# Patient Record
Sex: FEMALE | Race: WHITE | NOT HISPANIC OR LATINO | Employment: FULL TIME | URBAN - NONMETROPOLITAN AREA
[De-identification: names, ages, dates, MRNs, and addresses within clinical notes are randomized per-mention and may not be internally consistent; named-entity substitution may affect disease eponyms.]

---

## 2016-12-13 LAB
CBC, PLATELET CT, AND DIFF: (no result)
EXTERNAL ABO GROUPING: (no result)
EXTERNAL AMPHETAMINE SCREEN URINE: NEGATIVE
EXTERNAL ANTIBODY SCREEN: NORMAL
EXTERNAL BARBITURATE SCREEN URINE: NEGATIVE
EXTERNAL BENZODIAZEPINE SCREEN URINE: NEGATIVE
EXTERNAL COCAINE SCREEN URINE: NEGATIVE
EXTERNAL GC/CHLAMYDIA: NEGATIVE
EXTERNAL HEPATITIS B SURFACE ANTIGEN: NEGATIVE
EXTERNAL HEPATITIS C AB: NEGATIVE
EXTERNAL METHADONE SCREEN URINE: NEGATIVE
EXTERNAL OPIATES SCREEN URINE: NEGATIVE
EXTERNAL PHENCYCLIDINE SCREEN URINE: NEGATIVE
EXTERNAL PROPOXYPHENE SCREEN URINE: NEGATIVE
EXTERNAL RH FACTOR: POSITIVE
EXTERNAL RUBELLA QUALITATIVE: (no result)
EXTERNAL SYPHILIS RPR SCREEN: (no result)
EXTERNAL THC SCREEN URINE: NEGATIVE
EXTERNAL URINE CULTURE: NO GROWTH
HIV1 AB SPEC QL IA.RAPID: NEGATIVE

## 2017-01-04 LAB — EXTERNAL THINPREP: NORMAL

## 2017-02-08 ENCOUNTER — LAB (OUTPATIENT)
Dept: LAB | Facility: HOSPITAL | Age: 27
End: 2017-02-08

## 2017-02-08 ENCOUNTER — INITIAL PRENATAL (OUTPATIENT)
Dept: OBSTETRICS AND GYNECOLOGY | Facility: CLINIC | Age: 27
End: 2017-02-08

## 2017-02-08 VITALS — WEIGHT: 157 LBS | DIASTOLIC BLOOD PRESSURE: 64 MMHG | SYSTOLIC BLOOD PRESSURE: 102 MMHG | BODY MASS INDEX: 26.13 KG/M2

## 2017-02-08 DIAGNOSIS — Z36.89 ENCOUNTER FOR FETAL ANATOMIC SURVEY: ICD-10-CM

## 2017-02-08 DIAGNOSIS — Z34.92 ENCOUNTER FOR PREGNANCY RELATED EXAMINATION IN SECOND TRIMESTER: ICD-10-CM

## 2017-02-08 DIAGNOSIS — Z34.92 ENCOUNTER FOR PREGNANCY RELATED EXAMINATION IN SECOND TRIMESTER: Primary | ICD-10-CM

## 2017-02-08 DIAGNOSIS — Z3A.16 16 WEEKS GESTATION OF PREGNANCY: ICD-10-CM

## 2017-02-08 PROCEDURE — 82677 ASSAY OF ESTRIOL: CPT | Performed by: OBSTETRICS & GYNECOLOGY

## 2017-02-08 PROCEDURE — 82105 ALPHA-FETOPROTEIN SERUM: CPT | Performed by: OBSTETRICS & GYNECOLOGY

## 2017-02-08 PROCEDURE — 84702 CHORIONIC GONADOTROPIN TEST: CPT | Performed by: OBSTETRICS & GYNECOLOGY

## 2017-02-08 PROCEDURE — 36415 COLL VENOUS BLD VENIPUNCTURE: CPT

## 2017-02-08 PROCEDURE — 0501F PRENATAL FLOW SHEET: CPT | Performed by: OBSTETRICS & GYNECOLOGY

## 2017-02-08 PROCEDURE — 86336 INHIBIN A: CPT | Performed by: OBSTETRICS & GYNECOLOGY

## 2017-02-08 RX ORDER — PRENATAL VIT NO.126/IRON/FOLIC 28MG-0.8MG
TABLET ORAL DAILY
COMMUNITY
End: 2021-02-02

## 2017-02-08 NOTE — PROGRESS NOTES
Transfer of care.  Underwent C/S at 40 after NRFHT with induction.  Interested in . Discussed R/B/A

## 2017-02-10 LAB
2ND TRIMESTER 4 SCREEN SERPL-IMP: NORMAL
AFP ADJ MOM SERPL: 1.03
AFP INTERP SERPL-IMP: NORMAL
AFP SERPL-MCNC: 33.4 NG/ML
AGE AT DELIVERY: 28.5 YEARS
FET TS 18 RISK FROM MAT AGE: NORMAL
FET TS 21 RISK FROM MAT AGE: 820
GA METHOD: NORMAL
GA: 16.3 WEEKS
HCG ADJ MOM SERPL: 0.87
HCG SERPL-ACNC: NORMAL MIU/ML
IDDM PATIENT QL: NO
INHIBIN A ADJ MOM SERPL: 0.79
INHIBIN A SERPL-MCNC: 133.61 PG/ML
LABORATORY COMMENT REPORT: NORMAL
Lab: NORMAL
MULTIPLE PREGNANCY: NO
NEURAL TUBE DEFECT RISK FETUS: NORMAL %
RESULT: NORMAL
TS 18 RISK FETUS: NORMAL
TS 21 RISK FETUS: 5258
U ESTRIOL ADJ MOM SERPL: 0.79
U ESTRIOL SERPL-MCNC: 0.72 NG/ML

## 2017-03-01 RX ORDER — OSELTAMIVIR PHOSPHATE 75 MG/1
75 CAPSULE ORAL 2 TIMES DAILY
Qty: 14 CAPSULE | Refills: 0 | Status: SHIPPED | OUTPATIENT
Start: 2017-03-01 | End: 2017-03-08

## 2017-03-08 ENCOUNTER — ROUTINE PRENATAL (OUTPATIENT)
Dept: OBSTETRICS AND GYNECOLOGY | Facility: CLINIC | Age: 27
End: 2017-03-08

## 2017-03-08 VITALS — SYSTOLIC BLOOD PRESSURE: 106 MMHG | BODY MASS INDEX: 27.12 KG/M2 | DIASTOLIC BLOOD PRESSURE: 72 MMHG | WEIGHT: 163 LBS

## 2017-03-08 DIAGNOSIS — Z3A.20 20 WEEKS GESTATION OF PREGNANCY: ICD-10-CM

## 2017-03-08 DIAGNOSIS — Z34.92 ENCOUNTER FOR PREGNANCY RELATED EXAMINATION IN SECOND TRIMESTER: Primary | ICD-10-CM

## 2017-03-08 PROCEDURE — 0502F SUBSEQUENT PRENATAL CARE: CPT | Performed by: OBSTETRICS & GYNECOLOGY

## 2017-04-05 ENCOUNTER — ROUTINE PRENATAL (OUTPATIENT)
Dept: OBSTETRICS AND GYNECOLOGY | Facility: CLINIC | Age: 27
End: 2017-04-05

## 2017-04-05 VITALS — SYSTOLIC BLOOD PRESSURE: 108 MMHG | BODY MASS INDEX: 28.12 KG/M2 | WEIGHT: 169 LBS | DIASTOLIC BLOOD PRESSURE: 72 MMHG

## 2017-04-05 DIAGNOSIS — Z34.92 ENCOUNTER FOR PREGNANCY RELATED EXAMINATION IN SECOND TRIMESTER: Primary | ICD-10-CM

## 2017-04-05 DIAGNOSIS — Z3A.24 24 WEEKS GESTATION OF PREGNANCY: ICD-10-CM

## 2017-04-05 PROCEDURE — 0502F SUBSEQUENT PRENATAL CARE: CPT | Performed by: OBSTETRICS & GYNECOLOGY

## 2017-04-10 ENCOUNTER — RESULTS ENCOUNTER (OUTPATIENT)
Dept: OBSTETRICS AND GYNECOLOGY | Facility: CLINIC | Age: 27
End: 2017-04-10

## 2017-04-10 DIAGNOSIS — Z34.92 ENCOUNTER FOR PREGNANCY RELATED EXAMINATION IN SECOND TRIMESTER: ICD-10-CM

## 2017-04-27 DIAGNOSIS — Z36.9 ENCOUNTER FOR ANTENATAL SCREENING: Primary | ICD-10-CM

## 2017-05-02 ENCOUNTER — ROUTINE PRENATAL (OUTPATIENT)
Dept: OBSTETRICS AND GYNECOLOGY | Facility: CLINIC | Age: 27
End: 2017-05-02

## 2017-05-02 ENCOUNTER — APPOINTMENT (OUTPATIENT)
Dept: LAB | Facility: HOSPITAL | Age: 27
End: 2017-05-02

## 2017-05-02 VITALS — BODY MASS INDEX: 27.96 KG/M2 | WEIGHT: 168 LBS | SYSTOLIC BLOOD PRESSURE: 102 MMHG | DIASTOLIC BLOOD PRESSURE: 68 MMHG

## 2017-05-02 DIAGNOSIS — Z3A.28 28 WEEKS GESTATION OF PREGNANCY: ICD-10-CM

## 2017-05-02 DIAGNOSIS — O34.211 MATERNAL CARE DUE TO LOW TRANSVERSE UTERINE SCAR FROM PREVIOUS CESAREAN DELIVERY: Primary | ICD-10-CM

## 2017-05-02 PROBLEM — Z34.92 ENCOUNTER FOR PREGNANCY RELATED EXAMINATION IN SECOND TRIMESTER: Status: RESOLVED | Noted: 2017-02-08 | Resolved: 2017-05-02

## 2017-05-02 LAB
BASOPHILS # BLD AUTO: 0.01 10*3/MM3 (ref 0–0.2)
BASOPHILS NFR BLD AUTO: 0.1 % (ref 0–2)
DEPRECATED RDW RBC AUTO: 46 FL (ref 36.4–46.3)
EOSINOPHIL # BLD AUTO: 0.04 10*3/MM3 (ref 0–0.7)
EOSINOPHIL NFR BLD AUTO: 0.5 % (ref 0–7)
ERYTHROCYTE [DISTWIDTH] IN BLOOD BY AUTOMATED COUNT: 13.9 % (ref 11.5–14.5)
GLUCOSE 1H P 100 G GLC PO SERPL-MCNC: 129 MG/DL (ref 60–140)
HCT VFR BLD AUTO: 33.7 % (ref 35–45)
HGB BLD-MCNC: 11.8 G/DL (ref 12–15.5)
IMM GRANULOCYTES # BLD: 0.03 10*3/MM3 (ref 0–0.02)
IMM GRANULOCYTES NFR BLD: 0.3 % (ref 0–0.5)
LYMPHOCYTES # BLD AUTO: 0.91 10*3/MM3 (ref 0.6–4.2)
LYMPHOCYTES NFR BLD AUTO: 10.3 % (ref 10–50)
MCH RBC QN AUTO: 31.9 PG (ref 26.5–34)
MCHC RBC AUTO-ENTMCNC: 35 G/DL (ref 31.4–36)
MCV RBC AUTO: 91.1 FL (ref 80–98)
MONOCYTES # BLD AUTO: 0.46 10*3/MM3 (ref 0–0.9)
MONOCYTES NFR BLD AUTO: 5.2 % (ref 0–12)
NEUTROPHILS # BLD AUTO: 7.36 10*3/MM3 (ref 2–8.6)
NEUTROPHILS NFR BLD AUTO: 83.6 % (ref 37–80)
PLATELET # BLD AUTO: 162 10*3/MM3 (ref 150–450)
PMV BLD AUTO: 10.5 FL (ref 8–12)
RBC # BLD AUTO: 3.7 10*6/MM3 (ref 3.77–5.16)
WBC NRBC COR # BLD: 8.81 10*3/MM3 (ref 3.2–9.8)

## 2017-05-02 PROCEDURE — 82950 GLUCOSE TEST: CPT | Performed by: OBSTETRICS & GYNECOLOGY

## 2017-05-02 PROCEDURE — 0502F SUBSEQUENT PRENATAL CARE: CPT | Performed by: OBSTETRICS & GYNECOLOGY

## 2017-05-02 PROCEDURE — 85025 COMPLETE CBC W/AUTO DIFF WBC: CPT | Performed by: OBSTETRICS & GYNECOLOGY

## 2017-05-10 ENCOUNTER — TELEPHONE (OUTPATIENT)
Dept: OBSTETRICS AND GYNECOLOGY | Facility: CLINIC | Age: 27
End: 2017-05-10

## 2017-05-11 ENCOUNTER — PATIENT MESSAGE (OUTPATIENT)
Dept: OBSTETRICS AND GYNECOLOGY | Facility: CLINIC | Age: 27
End: 2017-05-11

## 2017-05-15 ENCOUNTER — ROUTINE PRENATAL (OUTPATIENT)
Dept: OBSTETRICS AND GYNECOLOGY | Facility: CLINIC | Age: 27
End: 2017-05-15

## 2017-05-15 VITALS — BODY MASS INDEX: 28.79 KG/M2 | DIASTOLIC BLOOD PRESSURE: 78 MMHG | SYSTOLIC BLOOD PRESSURE: 118 MMHG | WEIGHT: 173 LBS

## 2017-05-15 DIAGNOSIS — O34.211 MATERNAL CARE DUE TO LOW TRANSVERSE UTERINE SCAR FROM PREVIOUS CESAREAN DELIVERY: Primary | ICD-10-CM

## 2017-05-15 DIAGNOSIS — Z3A.30 30 WEEKS GESTATION OF PREGNANCY: ICD-10-CM

## 2017-05-15 PROCEDURE — 0502F SUBSEQUENT PRENATAL CARE: CPT | Performed by: OBSTETRICS & GYNECOLOGY

## 2017-05-31 ENCOUNTER — ROUTINE PRENATAL (OUTPATIENT)
Dept: OBSTETRICS AND GYNECOLOGY | Facility: CLINIC | Age: 27
End: 2017-05-31

## 2017-05-31 VITALS — BODY MASS INDEX: 28.46 KG/M2 | DIASTOLIC BLOOD PRESSURE: 69 MMHG | WEIGHT: 171 LBS | SYSTOLIC BLOOD PRESSURE: 109 MMHG

## 2017-05-31 DIAGNOSIS — Z3A.32 32 WEEKS GESTATION OF PREGNANCY: ICD-10-CM

## 2017-05-31 DIAGNOSIS — O34.211 MATERNAL CARE DUE TO LOW TRANSVERSE UTERINE SCAR FROM PREVIOUS CESAREAN DELIVERY: Primary | ICD-10-CM

## 2017-05-31 PROCEDURE — 0502F SUBSEQUENT PRENATAL CARE: CPT | Performed by: OBSTETRICS & GYNECOLOGY

## 2017-06-14 ENCOUNTER — ROUTINE PRENATAL (OUTPATIENT)
Dept: OBSTETRICS AND GYNECOLOGY | Facility: CLINIC | Age: 27
End: 2017-06-14

## 2017-06-14 VITALS — WEIGHT: 172 LBS | BODY MASS INDEX: 28.62 KG/M2 | DIASTOLIC BLOOD PRESSURE: 62 MMHG | SYSTOLIC BLOOD PRESSURE: 110 MMHG

## 2017-06-14 DIAGNOSIS — Z3A.34 34 WEEKS GESTATION OF PREGNANCY: ICD-10-CM

## 2017-06-14 DIAGNOSIS — O34.211 MATERNAL CARE DUE TO LOW TRANSVERSE UTERINE SCAR FROM PREVIOUS CESAREAN DELIVERY: Primary | ICD-10-CM

## 2017-06-14 PROCEDURE — 0502F SUBSEQUENT PRENATAL CARE: CPT | Performed by: OBSTETRICS & GYNECOLOGY

## 2017-06-28 ENCOUNTER — ROUTINE PRENATAL (OUTPATIENT)
Dept: OBSTETRICS AND GYNECOLOGY | Facility: CLINIC | Age: 27
End: 2017-06-28

## 2017-06-28 ENCOUNTER — APPOINTMENT (OUTPATIENT)
Dept: LAB | Facility: HOSPITAL | Age: 27
End: 2017-06-28

## 2017-06-28 VITALS — SYSTOLIC BLOOD PRESSURE: 118 MMHG | BODY MASS INDEX: 29.45 KG/M2 | WEIGHT: 177 LBS | DIASTOLIC BLOOD PRESSURE: 76 MMHG

## 2017-06-28 DIAGNOSIS — Z3A.36 36 WEEKS GESTATION OF PREGNANCY: ICD-10-CM

## 2017-06-28 DIAGNOSIS — O34.211 MATERNAL CARE DUE TO LOW TRANSVERSE UTERINE SCAR FROM PREVIOUS CESAREAN DELIVERY: ICD-10-CM

## 2017-06-28 DIAGNOSIS — Z36.9 ENCOUNTER FOR ANTENATAL SCREENING: Primary | ICD-10-CM

## 2017-06-28 PROCEDURE — 0502F SUBSEQUENT PRENATAL CARE: CPT | Performed by: OBSTETRICS & GYNECOLOGY

## 2017-06-28 PROCEDURE — 87653 STREP B DNA AMP PROBE: CPT | Performed by: OBSTETRICS & GYNECOLOGY

## 2017-06-29 LAB — GROUP B STREP, DNA: NEGATIVE

## 2017-07-06 ENCOUNTER — ROUTINE PRENATAL (OUTPATIENT)
Dept: OBSTETRICS AND GYNECOLOGY | Facility: CLINIC | Age: 27
End: 2017-07-06

## 2017-07-06 VITALS — DIASTOLIC BLOOD PRESSURE: 78 MMHG | WEIGHT: 178 LBS | BODY MASS INDEX: 29.62 KG/M2 | SYSTOLIC BLOOD PRESSURE: 122 MMHG

## 2017-07-06 DIAGNOSIS — O34.211 MATERNAL CARE DUE TO LOW TRANSVERSE UTERINE SCAR FROM PREVIOUS CESAREAN DELIVERY: Primary | ICD-10-CM

## 2017-07-06 DIAGNOSIS — Z3A.37 37 WEEKS GESTATION OF PREGNANCY: ICD-10-CM

## 2017-07-06 PROCEDURE — 0502F SUBSEQUENT PRENATAL CARE: CPT | Performed by: OBSTETRICS & GYNECOLOGY

## 2017-07-06 NOTE — PROGRESS NOTES
SOULEYMANE     Has had occasional ctx, nothing regular or painful.  Has had some mucous discharge, watery at times.  No vb.  +FM    SSE - negative pooling, perineum dry, nitrazine equiv, negative ferning +sperm present (patient does admit to sexual intercourse in past 24 hours)    A/P: SOULEYMANE @ 37w3d  - Term labor precautions   - No concern for ROM given negative pooling and ferning.  LOF precautions given  - Trying for   - GBS negative, RH positive  - RTC in 1 week with Dr. Vitale.     Jailyn Miranda

## 2017-07-12 ENCOUNTER — ROUTINE PRENATAL (OUTPATIENT)
Dept: OBSTETRICS AND GYNECOLOGY | Facility: CLINIC | Age: 27
End: 2017-07-12

## 2017-07-12 VITALS — WEIGHT: 175 LBS | DIASTOLIC BLOOD PRESSURE: 68 MMHG | SYSTOLIC BLOOD PRESSURE: 120 MMHG | BODY MASS INDEX: 29.12 KG/M2

## 2017-07-12 DIAGNOSIS — O34.211 MATERNAL CARE DUE TO LOW TRANSVERSE UTERINE SCAR FROM PREVIOUS CESAREAN DELIVERY: Primary | ICD-10-CM

## 2017-07-12 DIAGNOSIS — Z3A.38 38 WEEKS GESTATION OF PREGNANCY: ICD-10-CM

## 2017-07-12 PROCEDURE — 0502F SUBSEQUENT PRENATAL CARE: CPT | Performed by: OBSTETRICS & GYNECOLOGY

## 2017-07-18 ENCOUNTER — ROUTINE PRENATAL (OUTPATIENT)
Dept: OBSTETRICS AND GYNECOLOGY | Facility: CLINIC | Age: 27
End: 2017-07-18

## 2017-07-18 VITALS — BODY MASS INDEX: 29.29 KG/M2 | DIASTOLIC BLOOD PRESSURE: 68 MMHG | SYSTOLIC BLOOD PRESSURE: 118 MMHG | WEIGHT: 176 LBS

## 2017-07-18 DIAGNOSIS — Z3A.39 39 WEEKS GESTATION OF PREGNANCY: ICD-10-CM

## 2017-07-18 DIAGNOSIS — O34.211 MATERNAL CARE DUE TO LOW TRANSVERSE UTERINE SCAR FROM PREVIOUS CESAREAN DELIVERY: Primary | ICD-10-CM

## 2017-07-18 PROCEDURE — 0502F SUBSEQUENT PRENATAL CARE: CPT | Performed by: OBSTETRICS & GYNECOLOGY

## 2017-07-18 NOTE — PROGRESS NOTES
Notes pubic pain and pressure, heard a pop last wk and this was followed by a long walk and pubic area pain has been more severe and consistent since that time.

## 2017-07-19 ENCOUNTER — PREP FOR SURGERY (OUTPATIENT)
Dept: OTHER | Facility: HOSPITAL | Age: 27
End: 2017-07-19

## 2017-07-19 DIAGNOSIS — O34.211 MATERNAL CARE DUE TO LOW TRANSVERSE UTERINE SCAR FROM PREVIOUS CESAREAN DELIVERY: Primary | ICD-10-CM

## 2017-07-19 RX ORDER — LIDOCAINE HYDROCHLORIDE 10 MG/ML
5 INJECTION, SOLUTION INFILTRATION; PERINEURAL AS NEEDED
Status: CANCELLED | OUTPATIENT
Start: 2017-07-19

## 2017-07-19 RX ORDER — SODIUM CHLORIDE, SODIUM LACTATE, POTASSIUM CHLORIDE, CALCIUM CHLORIDE 600; 310; 30; 20 MG/100ML; MG/100ML; MG/100ML; MG/100ML
125 INJECTION, SOLUTION INTRAVENOUS CONTINUOUS
Status: CANCELLED | OUTPATIENT
Start: 2017-07-19

## 2017-07-19 RX ORDER — TRISODIUM CITRATE DIHYDRATE AND CITRIC ACID MONOHYDRATE 500; 334 MG/5ML; MG/5ML
30 SOLUTION ORAL ONCE
Status: CANCELLED | OUTPATIENT
Start: 2017-07-19 | End: 2017-07-19

## 2017-07-19 RX ORDER — METHYLERGONOVINE MALEATE 0.2 MG/ML
200 INJECTION INTRAVENOUS ONCE AS NEEDED
Status: CANCELLED | OUTPATIENT
Start: 2017-07-19

## 2017-07-19 RX ORDER — CARBOPROST TROMETHAMINE 250 UG/ML
250 INJECTION, SOLUTION INTRAMUSCULAR AS NEEDED
Status: CANCELLED | OUTPATIENT
Start: 2017-07-19

## 2017-07-19 RX ORDER — MISOPROSTOL 200 UG/1
800 TABLET ORAL AS NEEDED
Status: CANCELLED | OUTPATIENT
Start: 2017-07-19

## 2017-07-19 RX ORDER — SODIUM CHLORIDE 0.9 % (FLUSH) 0.9 %
1-10 SYRINGE (ML) INJECTION AS NEEDED
Status: CANCELLED | OUTPATIENT
Start: 2017-07-19

## 2017-07-21 ENCOUNTER — HOSPITAL ENCOUNTER (INPATIENT)
Facility: HOSPITAL | Age: 27
LOS: 2 days | Discharge: HOME | End: 2017-07-23
Attending: OBSTETRICS & GYNECOLOGY | Admitting: OBSTETRICS & GYNECOLOGY

## 2017-07-21 ENCOUNTER — ANESTHESIA EVENT (OUTPATIENT)
Dept: LABOR AND DELIVERY | Facility: HOSPITAL | Age: 27
End: 2017-07-21

## 2017-07-21 ENCOUNTER — ANESTHESIA (OUTPATIENT)
Dept: LABOR AND DELIVERY | Facility: HOSPITAL | Age: 27
End: 2017-07-21

## 2017-07-21 DIAGNOSIS — G89.18 POSTOPERATIVE PAIN: Primary | ICD-10-CM

## 2017-07-21 DIAGNOSIS — O34.211 MATERNAL CARE DUE TO LOW TRANSVERSE UTERINE SCAR FROM PREVIOUS CESAREAN DELIVERY: ICD-10-CM

## 2017-07-21 DIAGNOSIS — Z30.2 STERILIZATION: ICD-10-CM

## 2017-07-21 PROBLEM — O34.219 MATERNAL CARE FOR SCAR FROM PREVIOUS CESAREAN DELIVERY: Status: ACTIVE | Noted: 2017-07-21

## 2017-07-21 LAB
ABO GROUP BLD: NORMAL
AMPHET+METHAMPHET UR QL: NEGATIVE
BARBITURATES UR QL SCN: NEGATIVE
BENZODIAZ UR QL SCN: NEGATIVE
BLD GP AB SCN SERPL QL: NEGATIVE
CANNABINOIDS SERPL QL: NEGATIVE
COCAINE UR QL: NEGATIVE
DEPRECATED RDW RBC AUTO: 46 FL (ref 36.4–46.3)
ERYTHROCYTE [DISTWIDTH] IN BLOOD BY AUTOMATED COUNT: 13.9 % (ref 11.5–14.5)
HCT VFR BLD AUTO: 34.1 % (ref 35–45)
HGB BLD-MCNC: 11.8 G/DL (ref 12–15.5)
HOLD SPECIMEN: NORMAL
Lab: NORMAL
MCH RBC QN AUTO: 31.9 PG (ref 26.5–34)
MCHC RBC AUTO-ENTMCNC: 34.6 G/DL (ref 31.4–36)
MCV RBC AUTO: 92.2 FL (ref 80–98)
METHADONE UR QL SCN: NEGATIVE
OPIATES UR QL: NEGATIVE
OXYCODONE UR QL SCN: NEGATIVE
PLATELET # BLD AUTO: 155 10*3/MM3 (ref 150–450)
PMV BLD AUTO: 11.6 FL (ref 8–12)
RBC # BLD AUTO: 3.7 10*6/MM3 (ref 3.77–5.16)
RH BLD: POSITIVE
WBC NRBC COR # BLD: 8.63 10*3/MM3 (ref 3.2–9.8)

## 2017-07-21 PROCEDURE — 80307 DRUG TEST PRSMV CHEM ANLYZR: CPT | Performed by: OBSTETRICS & GYNECOLOGY

## 2017-07-21 PROCEDURE — 86850 RBC ANTIBODY SCREEN: CPT | Performed by: OBSTETRICS & GYNECOLOGY

## 2017-07-21 PROCEDURE — 59510 CESAREAN DELIVERY: CPT | Performed by: OBSTETRICS & GYNECOLOGY

## 2017-07-21 PROCEDURE — 86900 BLOOD TYPING SEROLOGIC ABO: CPT | Performed by: OBSTETRICS & GYNECOLOGY

## 2017-07-21 PROCEDURE — 25010000002 ONDANSETRON PER 1 MG: Performed by: NURSE ANESTHETIST, CERTIFIED REGISTERED

## 2017-07-21 PROCEDURE — 86901 BLOOD TYPING SEROLOGIC RH(D): CPT | Performed by: OBSTETRICS & GYNECOLOGY

## 2017-07-21 PROCEDURE — 25010000002 FENTANYL CITRATE (PF) 100 MCG/2ML SOLUTION: Performed by: NURSE ANESTHETIST, CERTIFIED REGISTERED

## 2017-07-21 PROCEDURE — 25010000002 MORPHINE PER 10 MG: Performed by: OBSTETRICS & GYNECOLOGY

## 2017-07-21 PROCEDURE — 0UB70ZZ EXCISION OF BILATERAL FALLOPIAN TUBES, OPEN APPROACH: ICD-10-PCS | Performed by: OBSTETRICS & GYNECOLOGY

## 2017-07-21 PROCEDURE — 88302 TISSUE EXAM BY PATHOLOGIST: CPT | Performed by: OBSTETRICS & GYNECOLOGY

## 2017-07-21 PROCEDURE — 88302 TISSUE EXAM BY PATHOLOGIST: CPT | Performed by: PATHOLOGY

## 2017-07-21 PROCEDURE — 85027 COMPLETE CBC AUTOMATED: CPT | Performed by: OBSTETRICS & GYNECOLOGY

## 2017-07-21 PROCEDURE — 25010000003 CEFAZOLIN PER 500 MG: Performed by: NURSE ANESTHETIST, CERTIFIED REGISTERED

## 2017-07-21 PROCEDURE — 25010000002 CEFAZOLIN PER 500 MG: Performed by: OBSTETRICS & GYNECOLOGY

## 2017-07-21 PROCEDURE — C1755 CATHETER, INTRASPINAL: HCPCS

## 2017-07-21 PROCEDURE — 58611 LIGATE OVIDUCT(S) ADD-ON: CPT | Performed by: OBSTETRICS & GYNECOLOGY

## 2017-07-21 PROCEDURE — 51703 INSERT BLADDER CATH COMPLEX: CPT

## 2017-07-21 RX ORDER — IBUPROFEN 800 MG/1
800 TABLET ORAL EVERY 8 HOURS SCHEDULED
Status: DISCONTINUED | OUTPATIENT
Start: 2017-07-21 | End: 2017-07-23 | Stop reason: HOSPADM

## 2017-07-21 RX ORDER — SODIUM CHLORIDE, SODIUM LACTATE, POTASSIUM CHLORIDE, CALCIUM CHLORIDE 600; 310; 30; 20 MG/100ML; MG/100ML; MG/100ML; MG/100ML
125 INJECTION, SOLUTION INTRAVENOUS CONTINUOUS
Status: DISCONTINUED | OUTPATIENT
Start: 2017-07-21 | End: 2017-07-23

## 2017-07-21 RX ORDER — DOCUSATE SODIUM 100 MG/1
100 CAPSULE, LIQUID FILLED ORAL 2 TIMES DAILY PRN
Status: DISCONTINUED | OUTPATIENT
Start: 2017-07-21 | End: 2017-07-23 | Stop reason: HOSPADM

## 2017-07-21 RX ORDER — FENTANYL CITRATE 50 UG/ML
INJECTION, SOLUTION INTRAMUSCULAR; INTRAVENOUS AS NEEDED
Status: DISCONTINUED | OUTPATIENT
Start: 2017-07-21 | End: 2017-07-21 | Stop reason: SURG

## 2017-07-21 RX ORDER — LIDOCAINE HYDROCHLORIDE 10 MG/ML
5 INJECTION, SOLUTION INFILTRATION; PERINEURAL AS NEEDED
Status: DISCONTINUED | OUTPATIENT
Start: 2017-07-21 | End: 2017-07-21 | Stop reason: HOSPADM

## 2017-07-21 RX ORDER — NALOXONE HCL 0.4 MG/ML
0.2 VIAL (ML) INJECTION
Status: DISCONTINUED | OUTPATIENT
Start: 2017-07-21 | End: 2017-07-23 | Stop reason: HOSPADM

## 2017-07-21 RX ORDER — METHYLERGONOVINE MALEATE 0.2 MG/ML
200 INJECTION INTRAVENOUS ONCE AS NEEDED
Status: DISCONTINUED | OUTPATIENT
Start: 2017-07-21 | End: 2017-07-21 | Stop reason: HOSPADM

## 2017-07-21 RX ORDER — DIPHENHYDRAMINE HCL 25 MG
25 CAPSULE ORAL EVERY 4 HOURS PRN
Status: DISCONTINUED | OUTPATIENT
Start: 2017-07-21 | End: 2017-07-23 | Stop reason: HOSPADM

## 2017-07-21 RX ORDER — SODIUM CHLORIDE, SODIUM LACTATE, POTASSIUM CHLORIDE, CALCIUM CHLORIDE 600; 310; 30; 20 MG/100ML; MG/100ML; MG/100ML; MG/100ML
INJECTION, SOLUTION INTRAVENOUS
Status: COMPLETED
Start: 2017-07-21 | End: 2017-07-21

## 2017-07-21 RX ORDER — DIPHENHYDRAMINE HYDROCHLORIDE 50 MG/ML
25 INJECTION INTRAMUSCULAR; INTRAVENOUS EVERY 4 HOURS PRN
Status: DISCONTINUED | OUTPATIENT
Start: 2017-07-21 | End: 2017-07-23 | Stop reason: HOSPADM

## 2017-07-21 RX ORDER — PRENATAL VIT/IRON FUM/FOLIC AC 27MG-0.8MG
1 TABLET ORAL DAILY
Status: DISCONTINUED | OUTPATIENT
Start: 2017-07-21 | End: 2017-07-23 | Stop reason: HOSPADM

## 2017-07-21 RX ORDER — TRISODIUM CITRATE DIHYDRATE AND CITRIC ACID MONOHYDRATE 500; 334 MG/5ML; MG/5ML
SOLUTION ORAL
Status: DISPENSED
Start: 2017-07-21 | End: 2017-07-21

## 2017-07-21 RX ORDER — SODIUM CHLORIDE 0.9 % (FLUSH) 0.9 %
1-10 SYRINGE (ML) INJECTION AS NEEDED
Status: DISCONTINUED | OUTPATIENT
Start: 2017-07-21 | End: 2017-07-21 | Stop reason: HOSPADM

## 2017-07-21 RX ORDER — TRISODIUM CITRATE DIHYDRATE AND CITRIC ACID MONOHYDRATE 500; 334 MG/5ML; MG/5ML
30 SOLUTION ORAL ONCE
Status: COMPLETED | OUTPATIENT
Start: 2017-07-21 | End: 2017-07-21

## 2017-07-21 RX ORDER — CARBOPROST TROMETHAMINE 250 UG/ML
250 INJECTION, SOLUTION INTRAMUSCULAR AS NEEDED
Status: DISCONTINUED | OUTPATIENT
Start: 2017-07-21 | End: 2017-07-21 | Stop reason: HOSPADM

## 2017-07-21 RX ORDER — NALOXONE HCL 0.4 MG/ML
0.1 VIAL (ML) INJECTION
Status: DISCONTINUED | OUTPATIENT
Start: 2017-07-21 | End: 2017-07-23 | Stop reason: HOSPADM

## 2017-07-21 RX ORDER — OXYCODONE HYDROCHLORIDE AND ACETAMINOPHEN 5; 325 MG/1; MG/1
2 TABLET ORAL EVERY 4 HOURS PRN
Status: DISCONTINUED | OUTPATIENT
Start: 2017-07-21 | End: 2017-07-23 | Stop reason: HOSPADM

## 2017-07-21 RX ORDER — OXYTOCIN/RINGER'S LACTATE 20/1000 ML
PLASTIC BAG, INJECTION (ML) INTRAVENOUS AS NEEDED
Status: DISCONTINUED | OUTPATIENT
Start: 2017-07-21 | End: 2017-07-21 | Stop reason: SURG

## 2017-07-21 RX ORDER — BISACODYL 10 MG
10 SUPPOSITORY, RECTAL RECTAL DAILY PRN
Status: DISCONTINUED | OUTPATIENT
Start: 2017-07-21 | End: 2017-07-23 | Stop reason: HOSPADM

## 2017-07-21 RX ORDER — SODIUM CHLORIDE, SODIUM LACTATE, POTASSIUM CHLORIDE, CALCIUM CHLORIDE 600; 310; 30; 20 MG/100ML; MG/100ML; MG/100ML; MG/100ML
1000 INJECTION, SOLUTION INTRAVENOUS ONCE
Status: COMPLETED | OUTPATIENT
Start: 2017-07-21 | End: 2017-07-21

## 2017-07-21 RX ORDER — MORPHINE SULFATE 1 MG/ML
INJECTION INTRAVENOUS CONTINUOUS
Status: DISCONTINUED | OUTPATIENT
Start: 2017-07-21 | End: 2017-07-23 | Stop reason: HOSPADM

## 2017-07-21 RX ORDER — MISOPROSTOL 200 UG/1
800 TABLET ORAL AS NEEDED
Status: DISCONTINUED | OUTPATIENT
Start: 2017-07-21 | End: 2017-07-21 | Stop reason: HOSPADM

## 2017-07-21 RX ORDER — ONDANSETRON 4 MG/1
4 TABLET, FILM COATED ORAL EVERY 8 HOURS PRN
Status: DISCONTINUED | OUTPATIENT
Start: 2017-07-21 | End: 2017-07-23 | Stop reason: HOSPADM

## 2017-07-21 RX ORDER — EPHEDRINE SULFATE 50 MG/ML
INJECTION, SOLUTION INTRAVENOUS AS NEEDED
Status: DISCONTINUED | OUTPATIENT
Start: 2017-07-21 | End: 2017-07-21 | Stop reason: SURG

## 2017-07-21 RX ORDER — SIMETHICONE 80 MG
80 TABLET,CHEWABLE ORAL 4 TIMES DAILY PRN
Status: DISCONTINUED | OUTPATIENT
Start: 2017-07-21 | End: 2017-07-23 | Stop reason: HOSPADM

## 2017-07-21 RX ORDER — PROMETHAZINE HYDROCHLORIDE 12.5 MG/1
12.5 TABLET ORAL EVERY 4 HOURS PRN
Status: DISCONTINUED | OUTPATIENT
Start: 2017-07-21 | End: 2017-07-23 | Stop reason: HOSPADM

## 2017-07-21 RX ORDER — CEFAZOLIN SODIUM 1 G/3ML
INJECTION, POWDER, FOR SOLUTION INTRAMUSCULAR; INTRAVENOUS AS NEEDED
Status: DISCONTINUED | OUTPATIENT
Start: 2017-07-21 | End: 2017-07-21 | Stop reason: SURG

## 2017-07-21 RX ORDER — ONDANSETRON 2 MG/ML
INJECTION INTRAMUSCULAR; INTRAVENOUS AS NEEDED
Status: DISCONTINUED | OUTPATIENT
Start: 2017-07-21 | End: 2017-07-21 | Stop reason: SURG

## 2017-07-21 RX ORDER — BUPIVACAINE HYDROCHLORIDE 7.5 MG/ML
INJECTION, SOLUTION EPIDURAL; RETROBULBAR AS NEEDED
Status: DISCONTINUED | OUTPATIENT
Start: 2017-07-21 | End: 2017-07-21 | Stop reason: SURG

## 2017-07-21 RX ORDER — ONDANSETRON 2 MG/ML
4 INJECTION INTRAMUSCULAR; INTRAVENOUS ONCE AS NEEDED
Status: ACTIVE | OUTPATIENT
Start: 2017-07-21 | End: 2017-07-22

## 2017-07-21 RX ORDER — OXYCODONE HYDROCHLORIDE AND ACETAMINOPHEN 5; 325 MG/1; MG/1
1 TABLET ORAL EVERY 4 HOURS PRN
Status: DISCONTINUED | OUTPATIENT
Start: 2017-07-21 | End: 2017-07-23 | Stop reason: HOSPADM

## 2017-07-21 RX ADMIN — MORPHINE SULFATE: 1 INJECTION INTRAVENOUS at 17:59

## 2017-07-21 RX ADMIN — SODIUM CHLORIDE, SODIUM LACTATE, POTASSIUM CHLORIDE, CALCIUM CHLORIDE 125 ML/HR: 600; 310; 30; 20 INJECTION, SOLUTION INTRAVENOUS at 14:02

## 2017-07-21 RX ADMIN — PRENATAL VIT W/ FE FUMARATE-FA TAB 27-0.8 MG 1 TABLET: 27-0.8 TAB at 22:01

## 2017-07-21 RX ADMIN — FENTANYL CITRATE 15 MCG: 50 INJECTION, SOLUTION INTRAMUSCULAR; INTRAVENOUS at 14:54

## 2017-07-21 RX ADMIN — IBUPROFEN 800 MG: 800 TABLET ORAL at 22:02

## 2017-07-21 RX ADMIN — CEFAZOLIN SODIUM 2 G: 10 INJECTION, POWDER, FOR SOLUTION INTRAVENOUS at 14:03

## 2017-07-21 RX ADMIN — Medication 150 ML: at 15:30

## 2017-07-21 RX ADMIN — EPHEDRINE SULFATE 5 MG: 50 INJECTION INTRAMUSCULAR; INTRAVENOUS; SUBCUTANEOUS at 14:55

## 2017-07-21 RX ADMIN — OXYCODONE HYDROCHLORIDE AND ACETAMINOPHEN 2 TABLET: 5; 325 TABLET ORAL at 23:28

## 2017-07-21 RX ADMIN — EPHEDRINE SULFATE 5 MG: 50 INJECTION INTRAMUSCULAR; INTRAVENOUS; SUBCUTANEOUS at 15:26

## 2017-07-21 RX ADMIN — SODIUM CITRATE AND CITRIC ACID MONOHYDRATE 30 ML: 500; 334 SOLUTION ORAL at 18:32

## 2017-07-21 RX ADMIN — SODIUM CHLORIDE, POTASSIUM CHLORIDE, SODIUM LACTATE AND CALCIUM CHLORIDE 125 ML/HR: 600; 310; 30; 20 INJECTION, SOLUTION INTRAVENOUS at 17:58

## 2017-07-21 RX ADMIN — Medication 100 ML: at 16:15

## 2017-07-21 RX ADMIN — Medication 100 ML: at 16:05

## 2017-07-21 RX ADMIN — EPHEDRINE SULFATE 5 MG: 50 INJECTION INTRAMUSCULAR; INTRAVENOUS; SUBCUTANEOUS at 15:11

## 2017-07-21 RX ADMIN — OXYCODONE HYDROCHLORIDE AND ACETAMINOPHEN 2 TABLET: 5; 325 TABLET ORAL at 19:40

## 2017-07-21 RX ADMIN — SODIUM CHLORIDE, POTASSIUM CHLORIDE, SODIUM LACTATE AND CALCIUM CHLORIDE 125 ML/HR: 600; 310; 30; 20 INJECTION, SOLUTION INTRAVENOUS at 14:02

## 2017-07-21 RX ADMIN — Medication 100 ML: at 15:55

## 2017-07-21 RX ADMIN — CEFAZOLIN SODIUM 2 G: 1 INJECTION, POWDER, FOR SOLUTION INTRAMUSCULAR; INTRAVENOUS at 14:57

## 2017-07-21 RX ADMIN — SODIUM CHLORIDE, POTASSIUM CHLORIDE, SODIUM LACTATE AND CALCIUM CHLORIDE 1000 ML: 600; 310; 30; 20 INJECTION, SOLUTION INTRAVENOUS at 10:14

## 2017-07-21 RX ADMIN — Medication 100 ML: at 15:25

## 2017-07-21 RX ADMIN — SODIUM CHLORIDE, SODIUM LACTATE, POTASSIUM CHLORIDE, CALCIUM CHLORIDE 125 ML/HR: 600; 310; 30; 20 INJECTION, SOLUTION INTRAVENOUS at 17:58

## 2017-07-21 RX ADMIN — Medication 50 ML: at 15:15

## 2017-07-21 RX ADMIN — EPHEDRINE SULFATE 5 MG: 50 INJECTION INTRAMUSCULAR; INTRAVENOUS; SUBCUTANEOUS at 15:18

## 2017-07-21 RX ADMIN — Medication 150 ML: at 15:40

## 2017-07-21 RX ADMIN — EPHEDRINE SULFATE 5 MG: 50 INJECTION INTRAMUSCULAR; INTRAVENOUS; SUBCUTANEOUS at 15:01

## 2017-07-21 RX ADMIN — Medication 100 ML: at 15:45

## 2017-07-21 RX ADMIN — ONDANSETRON 8 MG: 2 INJECTION INTRAMUSCULAR; INTRAVENOUS at 14:42

## 2017-07-21 RX ADMIN — BUPIVACAINE HYDROCHLORIDE 1.4 ML: 7.5 INJECTION, SOLUTION EPIDURAL; RETROBULBAR at 14:54

## 2017-07-21 RX ADMIN — Medication 100 ML: at 15:35

## 2017-07-21 RX ADMIN — Medication 150 ML: at 15:20

## 2017-07-21 RX ADMIN — SODIUM CHLORIDE, POTASSIUM CHLORIDE, SODIUM LACTATE AND CALCIUM CHLORIDE: 600; 310; 30; 20 INJECTION, SOLUTION INTRAVENOUS at 14:42

## 2017-07-21 NOTE — ANESTHESIA PREPROCEDURE EVALUATION
Anesthesia Evaluation     Patient summary reviewed and Nursing notes reviewed   NPO Solid Status: > 8 hours       Airway   Mallampati: II  TM distance: >3 FB  Neck ROM: full  no difficulty expected  Dental - normal exam     Pulmonary - negative pulmonary ROS and normal exam   Cardiovascular - negative cardio ROS and normal exam        Neuro/Psych- negative ROS  GI/Hepatic/Renal/Endo - negative ROS     Musculoskeletal (-) negative ROS    Abdominal  - normal exam   Substance History - negative use     OB/GYN    (+) Pregnant,         Other                                        Anesthesia Plan    ASA 2     spinal     Anesthetic plan and risks discussed with patient.

## 2017-07-21 NOTE — H&P
Marcia Bradley  : 1990  MRN: [id number]  [facility]: [id number]    History and Physical    Marcia Bradley is a 27 y.o. year old  with an Estimated Date of Delivery: 17 presenting for  delivery due to prior C/S.  She is planning for sterilization at the time of the . We reiterated the permanence of sterilization.    Her prenatal care has been benign.    Obstetric History       T1      TAB0   SAB0   E0   M0   L1       # Outcome Date GA Lbr Jimmy/2nd Weight Sex Delivery Anes PTL Lv   2 Current            1 Term 12 41w0d  8 lb 7 oz (3827 g) M CS-LTranv EPI N Y      Name: Santos        Past Medical History:   Diagnosis Date   • Acute laryngitis    • Acute sinusitis    • Allergic rhinitis    • Anxiety    • Depression    • Pelvic pain    • Vaginitis    • Yeast infection      Past Surgical History:   Procedure Laterality Date   •  SECTION     • INJECTION OF MEDICATION  2015    Depo Medrol (Methylprednisone) 80mg (2)  - DALILA Thompson   • WISDOM TOOTH EXTRACTION         Current Facility-Administered Medications:   •  ceFAZolin (ANCEF) 2 g in sodium chloride 0.9 % 100 mL IVPB, 2 g, Intravenous, Once, Grayson Vitale MD  •  lactated ringers infusion 1,000 mL, 1,000 mL, Intravenous, Once, Grayson Vitale MD  •  lactated ringers infusion, 125 mL/hr, Intravenous, Continuous, Grayson Vitale MD  •  lidocaine (XYLOCAINE) 1 % injection 5 mL, 5 mL, Intradermal, PRN, Grayson Vitale MD  •  Sod Citrate-Citric Acid (BICITRA) 500-334 MG/5ML solution  - ADS Override Pull, , , ,   •  Sod Citrate-Citric Acid (BICITRA) solution 30 mL, 30 mL, Oral, Once, Grayson Vitale MD  •  sodium chloride 0.9 % flush 1-10 mL, 1-10 mL, Intravenous, PRN, Grayson Vitale MD    No Known Allergies  Smoking status: Former Smoker                                                              Packs/day: 0.00      Years: 0.00      Smokeless status: Never Used                           no vaginal bleeding, she complains of some pelvic pain    LMP 10/17/2016  Breastfeeding? No  General: well developed; well nourished  no acute distress   Heart: Not performed.   Lungs: breathing is unlabored   Abdomen: soft, non-tender; no masses  no umbilical or inginual hernias are present  no hepato-splenomegaly     Prenatal Labs  Lab Results   Component Value Date    RUBELLASCRN Immune 2016    HEPBSAG Negative 2016    ABO O 2016    RH Positive 2016    ABSCRN Normal 2016    HEPCVIRUSABY negative 2016    BSNJQHM9ET 129 2017       Recent Labs  Lab Results   Component Value Date    HGB 11.8 (L) 2017    HCT 34.1 (L) 2017    WBC 8.63 2017     2017        Assessment   1. IUP with an Estimated Date of Delivery: 17  2. Planned  section due to previous C/S - declines   3. Desires permanent sterilization     Plan   Repeat  with sterilization The risks, benefits. and alternatives to  section were discussed.  The risks that were discussed included, but were not limited to failure, pain, infection, bleeding, hemorrhage; including need for transfusion to which she would have no objection; injury to the bowel, bladder, uterus, tubes, ovaries, or baby which could require further surgery or prolonged hospitalization.  The patient understands that we'll have leg pumps on her legs to try and reduce the risk of clot formation her legs.  She understands that we will have early ambulation to also reduce the risk of blood clot formation and to reduce the risk of pneumonia.  She will be given antibiotics prior to her surgery to help decrease the risk for infection.  All questions were answered.        This document has been electronically signed by Grayson Vitale MD on 2017 11:09 AM

## 2017-07-21 NOTE — PLAN OF CARE
Problem:  Delivery (Adult,Obstetrics,Pediatric)  Goal: Signs and Symptoms of Listed Potential Problems Will be Absent or Manageable ( Delivery)  Outcome: Outcome(s) achieved Date Met:  17 1803    Delivery   Problems Assessed ( Delivery) all   Problems Present ( Delivery) none         Problem: Anesthesia/Analgesia, Neuraxial (Obstetrics)  Goal: Signs and Symptoms of Listed Potential Problems Will be Absent or Manageable (Anesthesia/Analgesia, Neuraxial)  Outcome: Ongoing (interventions implemented as appropriate)

## 2017-07-21 NOTE — ANESTHESIA PROCEDURE NOTES
Spinal Block    Patient location during procedure: OR  Preanesthetic Checklist  Completed: patient identified, site marked, surgical consent, pre-op evaluation, timeout performed, IV checked, risks and benefits discussed and monitors and equipment checked  Spinal Block Prep:  Sterile Tech:cap, gloves, gown, mask and sterile barriers  Prep:Chloraprep  Patient Monitoring:blood pressure monitoring, continuous pulse oximetry and EKG  Spinal Block Procedure  Approach:midline  Guidance:landmark technique and palpation technique  Location:L3-L4  Needle Type:Pencan  Needle Gauge:24 G  Placement of Spinal needle event:cerebrospinal fluid aspirated    Fluid Appearance:clear  Post Assessment  Patient Tolerance:patient tolerated the procedure well with no apparent complications  Complications no

## 2017-07-21 NOTE — ANESTHESIA POSTPROCEDURE EVALUATION
Patient: Marcia Bradley    Procedure Summary     Date Anesthesia Start Anesthesia Stop Room / Location    17 [id number] [id number] [facility] LABOR DELIVERY  / [facility] LABOR DELIVERY       Procedure Diagnosis Surgeon Provider     SECTION REPEAT WITH TUBAL (N/A Abdomen) No diagnosis on file. MD Naty Simeon CRNA          Anesthesia Type: spinal  Last vitals  BP        Temp        Pulse       Resp        SpO2          Post Anesthesia Care and Evaluation    Patient location during evaluation: PACU  Patient participation: complete - patient participated  Level of consciousness: awake and alert  Pain management: adequate  Airway patency: patent  Anesthetic complications: No anesthetic complications    Cardiovascular status: acceptable  Respiratory status: acceptable  Hydration status: acceptable

## 2017-07-21 NOTE — OP NOTE
[facility]  Marcia Bradley  : 1990  MRN: [id number]  CSN: [id number]     Section Operative Note    Pre-Operative Dx:   1. IUP at 39w4d weeks   2. Prior C/S x1  3. Desires permanent sterilization      Postoperative dx:    1. IUP at 39 wk 4 d  2. Prior C/S x1  3. Desires permanent sterilization     Procedure: Repeat  (LTCS) with BTL       Surgeon: Grayson Vitale MD   Assistant: MD Manuela Russell       Anesthesia:   Spinal       EBL: 800 mls.   IV Fluids: 2000 mls.       Antibiotics: cefazolin 2 gms     Infant:      Name:  Robert      Gender: male  infant    Weight: 8 lb 15.2 oz (4.06 kg)     Apgars: 8   @ 1 minute /     9   @ 5 minutes    Cord gases: Venous:  @BABYNOHDR(BRIEFLAB, PHCVEN, BECVEN)@     Arterial:  @BABYNOHDR(BRIEFLAB, PHCART, BECART)@     Procedure Details:   Prepped and draped in the normal sterile fashion. Dorsal supine position with a slight leftward tilt. Pfannensteil incision made with scalpel and carried through to the underlying layer of fascia with the bovie. Fascia was incised in the midline and this incision was extended bilaterally with sharp disection. Superior aspect of fascia was elevated and the underlying layer of muscle dissected off with sharp and blunt dissection. Inferior aspect in similar fashion. Rectus muscles  in the midline. Peritoneum entered sharply and this was extended superiorly and inferiorly. Hysterotomy was made with scalpel and extended bluntly. The infants head was delivered atraumatically which was promptly followed by the rest of the infant. The cord was clamped and cut and infant was passed off to the awaiting nursing staff. Placenta was delivered spontaneously. The uterus was exteriorized and cleared of all clots and debris. Hysterotomy was closed with 0 Vicryl and additional figure of 8 sutures applied for hemostasis. The right fallopian tube was elevated and a small window made in mesosalpinx. The  tube was ligated at proximal and distal ends of this. The left tube in similar fashion was elevated, a window made in mesosalpinx, ligated proximal and distal and a segment of tube excised. Uterus was returned to the patient's abdomen and gutters cleared of all clot. Again hemostasis was noted. Fascia was reaproximated with vicryl and irrigated. Skin was closed with monocryl subcuticular stitch.   All counts correct.        Complications:   None      Disposition:   Mother to Mother Baby/Postpartum  in stable condition currently.   Baby to remains with mom  in stable condition currently.       Grayson Vitale MD  7/21/2017  4:12 PM

## 2017-07-22 LAB
BASOPHILS # BLD AUTO: 0.01 10*3/MM3 (ref 0–0.2)
BASOPHILS NFR BLD AUTO: 0.1 % (ref 0–2)
DEPRECATED RDW RBC AUTO: 46.3 FL (ref 36.4–46.3)
EOSINOPHIL # BLD AUTO: 0.03 10*3/MM3 (ref 0–0.7)
EOSINOPHIL NFR BLD AUTO: 0.3 % (ref 0–7)
ERYTHROCYTE [DISTWIDTH] IN BLOOD BY AUTOMATED COUNT: 13.6 % (ref 11.5–14.5)
HCT VFR BLD AUTO: 30.2 % (ref 35–45)
HGB BLD-MCNC: 10.3 G/DL (ref 12–15.5)
IMM GRANULOCYTES # BLD: 0.04 10*3/MM3 (ref 0–0.02)
IMM GRANULOCYTES NFR BLD: 0.4 % (ref 0–0.5)
LYMPHOCYTES # BLD AUTO: 0.81 10*3/MM3 (ref 0.6–4.2)
LYMPHOCYTES NFR BLD AUTO: 8.4 % (ref 10–50)
MCH RBC QN AUTO: 31.7 PG (ref 26.5–34)
MCHC RBC AUTO-ENTMCNC: 34.1 G/DL (ref 31.4–36)
MCV RBC AUTO: 92.9 FL (ref 80–98)
MONOCYTES # BLD AUTO: 0.88 10*3/MM3 (ref 0–0.9)
MONOCYTES NFR BLD AUTO: 9.1 % (ref 0–12)
NEUTROPHILS # BLD AUTO: 7.92 10*3/MM3 (ref 2–8.6)
NEUTROPHILS NFR BLD AUTO: 81.7 % (ref 37–80)
PLATELET # BLD AUTO: 130 10*3/MM3 (ref 150–450)
PMV BLD AUTO: 11.7 FL (ref 8–12)
RBC # BLD AUTO: 3.25 10*6/MM3 (ref 3.77–5.16)
WBC NRBC COR # BLD: 9.69 10*3/MM3 (ref 3.2–9.8)

## 2017-07-22 PROCEDURE — 94799 UNLISTED PULMONARY SVC/PX: CPT

## 2017-07-22 PROCEDURE — 85025 COMPLETE CBC W/AUTO DIFF WBC: CPT | Performed by: OBSTETRICS & GYNECOLOGY

## 2017-07-22 PROCEDURE — 90471 IMMUNIZATION ADMIN: CPT | Performed by: OBSTETRICS & GYNECOLOGY

## 2017-07-22 PROCEDURE — 90715 TDAP VACCINE 7 YRS/> IM: CPT | Performed by: OBSTETRICS & GYNECOLOGY

## 2017-07-22 PROCEDURE — 94760 N-INVAS EAR/PLS OXIMETRY 1: CPT

## 2017-07-22 PROCEDURE — 25010000002 TDAP 5-2.5-18.5 LF-MCG/0.5 SUSPENSION: Performed by: OBSTETRICS & GYNECOLOGY

## 2017-07-22 RX ADMIN — OXYCODONE HYDROCHLORIDE AND ACETAMINOPHEN 2 TABLET: 5; 325 TABLET ORAL at 07:06

## 2017-07-22 RX ADMIN — IBUPROFEN 800 MG: 800 TABLET ORAL at 21:38

## 2017-07-22 RX ADMIN — DOCUSATE SODIUM 100 MG: 100 CAPSULE, LIQUID FILLED ORAL at 21:39

## 2017-07-22 RX ADMIN — IBUPROFEN 800 MG: 800 TABLET ORAL at 05:51

## 2017-07-22 RX ADMIN — OXYCODONE HYDROCHLORIDE AND ACETAMINOPHEN 2 TABLET: 5; 325 TABLET ORAL at 11:02

## 2017-07-22 RX ADMIN — IBUPROFEN 800 MG: 800 TABLET ORAL at 15:40

## 2017-07-22 RX ADMIN — OXYCODONE HYDROCHLORIDE AND ACETAMINOPHEN 1 TABLET: 5; 325 TABLET ORAL at 21:39

## 2017-07-22 RX ADMIN — OXYCODONE HYDROCHLORIDE AND ACETAMINOPHEN 2 TABLET: 5; 325 TABLET ORAL at 17:17

## 2017-07-22 RX ADMIN — DOCUSATE SODIUM 100 MG: 100 CAPSULE, LIQUID FILLED ORAL at 06:10

## 2017-07-22 RX ADMIN — TETANUS TOXOID, REDUCED DIPHTHERIA TOXOID AND ACELLULAR PERTUSSIS VACCINE, ADSORBED 0.5 ML: 5; 2.5; 8; 8; 2.5 SUSPENSION INTRAMUSCULAR at 05:51

## 2017-07-22 NOTE — PROGRESS NOTES
[facility]  [facility] 1990  MRN: [id number]  CSN: [id number]    Post-operative Day #1  Subjective   Her pain is well controlled.  Vaginal bleeding is appropriate amount.  She is passing gas and has not had a bowel movement.     Objective     Min/max vitals past 24 hours:   Temp  Min: 97.7 °F (36.5 °C)  Max: 98.4 °F (36.9 °C)  BP  Min: 103/46  Max: 130/63  Pulse  Min: 61  Max: 87  Pulse  Min: 61  Max: 87        General: well developed; well nourished  no acute distress   Abdomen: normoactive bowel sounds   Pelvic: Not performed   Ext: Calves NT     Lab Results   Component Value Date    WBC 9.69 2017    HGB 10.3 (L) 2017    HCT 30.2 (L) 2017    MCV 92.9 2017     (L) 2017    RH Positive 2017    HEPBSAG Negative 2016        Assessment   1. POD #1 S/P Repeat LTCS with pp tubal     Plan   1. Continue routine postpartum care  2. Ambulate    Alon Meraz MD  2017  10:06 AM

## 2017-07-22 NOTE — PLAN OF CARE
Problem: Anesthesia/Analgesia, Neuraxial (Obstetrics)  Goal: Signs and Symptoms of Listed Potential Problems Will be Absent or Manageable (Anesthesia/Analgesia, Neuraxial)  Outcome: Outcome(s) achieved Date Met:  17    Problem: Postpartum ( Delivery) (Adult)  Goal: Signs and Symptoms of Listed Potential Problems Will be Absent or Manageable (Postpartum)  Outcome: Ongoing (interventions implemented as appropriate)

## 2017-07-23 VITALS
HEART RATE: 80 BPM | SYSTOLIC BLOOD PRESSURE: 118 MMHG | OXYGEN SATURATION: 98 % | RESPIRATION RATE: 18 BRPM | TEMPERATURE: 99.7 F | DIASTOLIC BLOOD PRESSURE: 67 MMHG

## 2017-07-23 PROCEDURE — 25010000002 KETOROLAC TROMETHAMINE PER 15 MG: Performed by: OBSTETRICS & GYNECOLOGY

## 2017-07-23 RX ORDER — HYDROCODONE BITARTRATE AND ACETAMINOPHEN 5; 325 MG/1; MG/1
1 TABLET ORAL EVERY 6 HOURS PRN
Qty: 40 TABLET | Refills: 0 | Status: SHIPPED | OUTPATIENT
Start: 2017-07-23 | End: 2017-08-07

## 2017-07-23 RX ORDER — KETOROLAC TROMETHAMINE 30 MG/ML
60 INJECTION, SOLUTION INTRAMUSCULAR; INTRAVENOUS ONCE
Status: COMPLETED | OUTPATIENT
Start: 2017-07-23 | End: 2017-07-23

## 2017-07-23 RX ORDER — IBUPROFEN 800 MG/1
800 TABLET ORAL EVERY 8 HOURS PRN
Qty: 60 TABLET | Refills: 12 | Status: SHIPPED | OUTPATIENT
Start: 2017-07-23 | End: 2021-02-02

## 2017-07-23 RX ADMIN — OXYCODONE HYDROCHLORIDE AND ACETAMINOPHEN 1 TABLET: 5; 325 TABLET ORAL at 05:32

## 2017-07-23 RX ADMIN — IBUPROFEN 800 MG: 800 TABLET ORAL at 14:30

## 2017-07-23 RX ADMIN — KETOROLAC TROMETHAMINE 60 MG: 60 INJECTION, SOLUTION INTRAMUSCULAR at 15:48

## 2017-07-23 RX ADMIN — OXYCODONE HYDROCHLORIDE AND ACETAMINOPHEN 2 TABLET: 5; 325 TABLET ORAL at 11:16

## 2017-07-23 RX ADMIN — PRENATAL VIT W/ FE FUMARATE-FA TAB 27-0.8 MG 1 TABLET: 27-0.8 TAB at 08:58

## 2017-07-23 RX ADMIN — IBUPROFEN 800 MG: 800 TABLET ORAL at 05:32

## 2017-07-23 RX ADMIN — SIMETHICONE CHEW TAB 80 MG 80 MG: 80 TABLET ORAL at 05:32

## 2017-07-23 NOTE — PROGRESS NOTES
[facility]  [facility] 1990  MRN: [id number]  CSN: [id number]    Post-operative Day #2  Subjective   Her pain is well controlled.  Vaginal bleeding is appropriate amount.  She is passing gas and has not had a bowel movement.  She wants to go home.     Objective     Min/max vitals past 24 hours:   Temp  Min: 97.9 °F (36.6 °C)  Max: 98.8 °F (37.1 °C)  BP  Min: 100/51  Max: 117/66  Pulse  Min: 69  Max: 74  Pulse  Min: 69  Max: 74        General: well developed; well nourished  no acute distress   Abdomen: incision is healing, clean, dry and intact   Pelvic: Not performed   Ext: Calves NT     Lab Results   Component Value Date    WBC 9.69 2017    HGB 10.3 (L) 2017    HCT 30.2 (L) 2017    MCV 92.9 2017     (L) 2017    RH Positive 2017    HEPBSAG Negative 2016        Assessment   1. POD #2 S/P Repeat LTCS with pp tubal     Plan   1. Discharge to home  2. Advised no tampons or intercourse for 6 weeks.  3. D/C questions all answered  4. Follow-up appointment in 2 week(s)    Alon Meraz MD  2017  8:42 AM

## 2017-07-23 NOTE — DISCHARGE SUMMARY
[facility]  Marcia Bradley  : 1990  MRN: [id number]  CSN: [id number]    Discharge Summary      Date of Admission: 2017   Date of Discharge: 2017   Principle Discharge Dx: 1. S/P RLTCS/BTL   Procedures Performed: Procedure(s):   SECTION REPEAT WITH TUBAL   Brief History: Patient is a 27 y.o. now  who presented to labor and delivery for scheduled RLTCS/BTL at term.   Hospital Course: Her post-operative course was unremarkable.  On POD # 2 she expressed the desire for D/C. She had no febrile morbidity. She had passed gas, and was urinating normally. She was eating a regular diet without difficulty. She was ambulating well. Her incision was clean, dry and intact. Discharge instructions were given.  All questions were answered   Pending Studies: None   Discharge Medications:    Your medication list      START taking these medications       Instructions Last Dose Given Next Dose Due    HYDROcodone-acetaminophen 5-325 MG per tablet   Commonly known as:  NORCO        Take 1 tablet by mouth Every 6 (Six) Hours As Needed for Severe Pain  (7-10).         ibuprofen 800 MG tablet   Commonly known as:  ADVIL,MOTRIN        Take 1 tablet by mouth Every 8 (Eight) Hours As Needed for Mild Pain (1-3).           CONTINUE taking these medications       Instructions Last Dose Given Next Dose Due    prenatal (CLASSIC) vitamin 28-0.8 MG tablet tablet                   Where to Get Your Medications      You can get these medications from any pharmacy     Bring a paper prescription for each of these medications    • HYDROcodone-acetaminophen 5-325 MG per tablet   • ibuprofen 800 MG tablet            Discharge Disposition: home   Follow-up: Future Appointments  Date Time Provider Department Center   2017 2:00 PM Grayson Vitale MD [facility] None      Reschedule follow up with Dr. Vitale in 2 weeks.    This note has been electronically signed.          This document has been electronically  signed by Alon Meraz MD on July 23, 2017 8:47 AM

## 2017-07-25 LAB
LAB AP CASE REPORT: NORMAL
Lab: NORMAL
PATH REPORT.FINAL DX SPEC: NORMAL
PATH REPORT.GROSS SPEC: NORMAL

## 2017-08-03 ENCOUNTER — TELEPHONE (OUTPATIENT)
Dept: OBSTETRICS AND GYNECOLOGY | Facility: CLINIC | Age: 27
End: 2017-08-03

## 2017-08-03 RX ORDER — FLUCONAZOLE 150 MG/1
150 TABLET ORAL ONCE
Qty: 1 TABLET | Refills: 0 | Status: SHIPPED | OUTPATIENT
Start: 2017-08-03 | End: 2017-08-03

## 2017-08-03 RX ORDER — CEPHALEXIN 500 MG/1
CAPSULE ORAL
Qty: 20 CAPSULE | Refills: 0 | Status: SHIPPED | OUTPATIENT
Start: 2017-08-03 | End: 2021-02-02

## 2017-08-03 NOTE — TELEPHONE ENCOUNTER
----- Message from Sheron Adwoasubha Mars sent at 8/2/2017  4:16 PM CDT -----  Contact: [phone]  PT CALLED AND WAS TRYING TO GET A HOLD OF THE LACTATION NURSE BUT THERE WAS NO ANSWER. SHE THINKS SHE MAY HAVE A BREAST INFECTION AND WAS WONDERING IF SHE NEEDS TO COME IN SOONER? SHE HAS AN APPOINTMENT ON 08/07/2017@[facility]. SHE SAID IF SHE DOESN'T THEN SHE WAS WONDERING IF DR ALEGRE WOULD CALL IN ANTIBIOTIC.    PT WOULD LIKE A CALL BACK.    THANKS    I called this patient and talked to zahraa garcia about this problem.  I called in Keflex and Diflucan for her.  We told her to put warm compresses on the sore breast and to pump every two hours.  She is to massage the red area of her breast while the baby is feeding.

## 2017-08-07 ENCOUNTER — OFFICE VISIT (OUTPATIENT)
Dept: OBSTETRICS AND GYNECOLOGY | Facility: CLINIC | Age: 27
End: 2017-08-07

## 2017-08-07 VITALS
DIASTOLIC BLOOD PRESSURE: 68 MMHG | SYSTOLIC BLOOD PRESSURE: 118 MMHG | BODY MASS INDEX: 25.16 KG/M2 | HEIGHT: 65 IN | WEIGHT: 151 LBS

## 2017-08-07 DIAGNOSIS — Z09 SURGICAL FOLLOW-UP CARE: Primary | ICD-10-CM

## 2017-08-07 PROCEDURE — 99024 POSTOP FOLLOW-UP VISIT: CPT | Performed by: OBSTETRICS & GYNECOLOGY

## 2017-08-07 NOTE — PROGRESS NOTES
Subjective   Marcia Bradley is a 27 y.o. female.     HPI Comments: Patient presents today for f/u 2 wk s/p RLTCS/BTL  Pain well controlled  Light loghia  Br/Milton feeding  Not feeling down or depressed  S/p BTL        Review of Systems   All other systems reviewed and are negative.      Objective   Physical Exam   Constitutional: She is oriented to person, place, and time. She appears well-developed and well-nourished. No distress.   HENT:   Head: Normocephalic and atraumatic.   Right Ear: External ear normal.   Left Ear: External ear normal.   Nose: Nose normal.   Mouth/Throat: Oropharynx is clear and moist.   Abdominal: Soft.   ICDI   Neurological: She is alert and oriented to person, place, and time.   Skin: She is not diaphoretic.   Psychiatric: She has a normal mood and affect. Her behavior is normal. Judgment and thought content normal.   Vitals reviewed.      Assessment/Plan   Marcia was seen today for post-op.    Diagnoses and all orders for this visit:    Surgical follow-up care       F/u 4 wk for 6 wk PP visit

## 2017-10-11 ENCOUNTER — TELEPHONE (OUTPATIENT)
Dept: OBSTETRICS AND GYNECOLOGY | Facility: CLINIC | Age: 27
End: 2017-10-11

## 2017-10-11 NOTE — TELEPHONE ENCOUNTER
----- Message from Marcia Bradley sent at 10/11/2017  1:12 PM CDT -----  Regarding: Non-Urgent Medical Question  Contact: [phone]    Hello,   I went back to work today and my boss is just now telling me that she need a release to work form from you guys before I can technically start. Can you send it to me on here or to [email or web address].    Thanks,  Marcia Bradley

## 2019-07-12 ENCOUNTER — LAB REQUISITION (OUTPATIENT)
Dept: LAB | Facility: OTHER | Age: 29
End: 2019-07-12

## 2019-07-12 DIAGNOSIS — Z00.00 ROUTINE GENERAL MEDICAL EXAMINATION AT A HEALTH CARE FACILITY: ICD-10-CM

## 2019-07-12 PROCEDURE — UDS COCC - COLLECTION FEE ONLY: Performed by: INTERNAL MEDICINE

## 2021-02-02 ENCOUNTER — OFFICE VISIT (OUTPATIENT)
Dept: FAMILY MEDICINE CLINIC | Facility: CLINIC | Age: 31
End: 2021-02-02

## 2021-02-02 VITALS
OXYGEN SATURATION: 99 % | SYSTOLIC BLOOD PRESSURE: 128 MMHG | BODY MASS INDEX: 27.42 KG/M2 | WEIGHT: 164.6 LBS | DIASTOLIC BLOOD PRESSURE: 84 MMHG | HEART RATE: 78 BPM | HEIGHT: 65 IN

## 2021-02-02 DIAGNOSIS — S16.1XXA ACUTE STRAIN OF NECK MUSCLE, INITIAL ENCOUNTER: Primary | ICD-10-CM

## 2021-02-02 DIAGNOSIS — D22.9 BENIGN NEVUS: ICD-10-CM

## 2021-02-02 DIAGNOSIS — S03.40XA SPRAIN OF TEMPOROMANDIBULAR JOINT, INITIAL ENCOUNTER: ICD-10-CM

## 2021-02-02 PROCEDURE — 99202 OFFICE O/P NEW SF 15 MIN: CPT | Performed by: NURSE PRACTITIONER

## 2021-02-02 RX ORDER — IBUPROFEN 600 MG/1
TABLET ORAL
COMMUNITY
Start: 2021-01-25 | End: 2022-12-14

## 2021-02-02 RX ORDER — PREDNISONE 10 MG/1
TABLET ORAL
Qty: 21 TABLET | Refills: 0 | Status: SHIPPED | OUTPATIENT
Start: 2021-02-02 | End: 2022-12-14

## 2021-02-02 RX ORDER — CYCLOBENZAPRINE HCL 10 MG
TABLET ORAL
COMMUNITY
Start: 2021-01-25 | End: 2022-12-14

## 2021-02-02 NOTE — PROGRESS NOTES
CC: Establish Care, Temporomandibular Joint Pain (possible), Nevus (right shoulder), and Dizziness (when bending over for long periods of time )      Subjective:  Marcia Bradley is a 31 y.o. female who presents for     In to Establish care. CMH: Allergic Rhinitis, Anxiety, and Depression. Surgical History:  x 2 and [place] tooth extraction. Family History: Paternal Grandfather DM and Stroke, Paternal Grandmother Stroke. Social History- Former smoker over 10 years ago, Denies illicit drug use, Alcohol use- yes.    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 Is having pain in her upper shoulders and radiates into the neck bilaterally. Pain is sharp. The pain is intermittent. Worse at work. Rates pain at 10 on pain scale. Lifting and repetitive movements and bending over aggravates the pain. States ice and massage helps to alleviate the pain. Treated with Motrin every 6 hours and Flexeril. States these do not help the pain while at work. Wondering if she could do light duty at work for about a month.    C/O bilateral jaw pain ongoing x 2 weeks. Was evaluated per Dentist and was advised this was TMJ, but felt pt needed to be seen for the shoulder pain also. Was treated with Motrin and Flexeril. Pt also using a bite guard which has helped with symptoms.     C/O mole to the right shoulder. Has been there her whole life. Just wants it checked to see if it needs removed.      The following portions of the patient's history were reviewed and updated as appropriate: allergies, current medications, past family history, past medical history, past social history, past surgical history and problem list.    Past Medical History:   Diagnosis Date   • Allergic rhinitis    • Anxiety    • Depression          Current Outpatient Medications:   •  cyclobenzaprine (FLEXERIL) 10 MG tablet, , Disp: , Rfl:   •  ibuprofen (ADVIL,MOTRIN) 600 MG tablet, , Disp: , Rfl:   •  " predniSONE (DELTASONE) 10 MG (48) dose pack, Take as directed on package, Disp: 21 tablet, Rfl: 0    Review of Systems    Review of Systems   Constitutional: Negative.    HENT: Negative.    Eyes: Negative.    Respiratory: Negative.    Cardiovascular: Negative.    Gastrointestinal: Negative.    Endocrine: Negative.    Genitourinary: Negative.    Musculoskeletal: Positive for arthralgias, myalgias and neck pain.   Skin: Negative.         Nevus Rt shoulder   Allergic/Immunologic: Negative.    Neurological: Negative.    Hematological: Negative.    Psychiatric/Behavioral: Negative.    All other systems reviewed and are negative.      Objective  Vitals:    02/02/21 1301   BP: 128/84   Pulse: 78   SpO2: 99%   Weight: 74.7 kg (164 lb 9.6 oz)   Height: 165.1 cm (65\")     Body mass index is 27.39 kg/m².    Physical Exam    Physical Exam  Vitals signs and nursing note reviewed.   Constitutional:       General: She is not in acute distress.     Appearance: Normal appearance. She is well-developed. She is not ill-appearing, toxic-appearing or diaphoretic.   HENT:      Head: Normocephalic and atraumatic.   Eyes:      General: No scleral icterus.        Right eye: No discharge.         Left eye: No discharge.      Extraocular Movements: Extraocular movements intact.      Conjunctiva/sclera: Conjunctivae normal.   Neck:      Musculoskeletal: Normal range of motion and neck supple. No neck rigidity or muscular tenderness.      Vascular: No carotid bruit.   Cardiovascular:      Rate and Rhythm: Normal rate and regular rhythm.      Pulses: Normal pulses.      Heart sounds: Normal heart sounds. No murmur. No friction rub. No gallop.    Pulmonary:      Effort: Pulmonary effort is normal. No respiratory distress.      Breath sounds: Normal breath sounds. No stridor. No wheezing, rhonchi or rales.   Chest:      Chest wall: No tenderness.   Abdominal:      General: Bowel sounds are normal. There is no distension.      Palpations: Abdomen " is soft. There is no mass.      Tenderness: There is no abdominal tenderness. There is no guarding or rebound.      Hernia: No hernia is present.   Musculoskeletal: Normal range of motion.         General: Tenderness (Tenderness on palpation of bilateral SCM muscles) present. No swelling, deformity or signs of injury.      Right lower leg: No edema.      Left lower leg: No edema.   Lymphadenopathy:      Cervical: No cervical adenopathy.   Skin:     General: Skin is warm and dry.      Capillary Refill: Capillary refill takes less than 2 seconds.      Coloration: Skin is not jaundiced or pale.      Findings: Lesion (Nevus 5mm noted to rt shoulder. Light Brown in color. Margins regular and symmetrical.) present. No bruising, erythema or rash.   Neurological:      General: No focal deficit present.      Mental Status: She is alert and oriented to person, place, and time. Mental status is at baseline.      Cranial Nerves: No cranial nerve deficit.   Psychiatric:         Mood and Affect: Mood normal.         Behavior: Behavior normal.         Thought Content: Thought content normal.         Judgment: Judgment normal.             Diagnoses and all orders for this visit:    1. Acute strain of neck muscle, initial encounter (Primary)  -     predniSONE (DELTASONE) 10 MG (48) dose pack; Take as directed on package  Dispense: 21 tablet; Refill: 0    2. Sprain of temporomandibular joint, initial encounter  -     predniSONE (DELTASONE) 10 MG (48) dose pack; Take as directed on package  Dispense: 21 tablet; Refill: 0    3. Benign nevus       Will put patient on light duty x1 month to see if this will help with the muscle strain/pain.  My concern with doing this is patient will return to regular duties in 1 month and the pain will return.  Patient given office fax number to fax these forms to get filled out for light duty. Will initiate patient on prednisone Dosepak 10 mg to take as instructed on package.  Advised to start this on  tomorrow.  Advised on how to take medication and possible side effects.  Advised to continue the Motrin and Flexeril as needed.  Reassured about mole on her shoulder as it looks normal.  Advised to follow-up in 1 month or sooner if needed.  At that time, we will do a complete exam with labs.  Answered all questions.  Patient verbalized understanding of plan of care.      This document has been electronically signed by GURPREET De Guzman on February 2, 2021 14:17 CST

## 2021-02-02 NOTE — PATIENT INSTRUCTIONS
Muscle Strain  A muscle strain is an injury that happens when a muscle is stretched longer than normal. This can happen during a fall, sports, or lifting. This can tear some muscle fibers. Usually, recovery from muscle strain takes 1-2 weeks. Complete healing normally takes 5-6 weeks.  This condition is first treated with PRICE therapy. This involves:  · Protecting your muscle from being injured again.  · Resting your injured muscle.  · Icing your injured muscle.  · Applying pressure (compression) to your injured muscle. This may be done with a splint or elastic bandage.  · Raising (elevating) your injured muscle.  Your doctor may also recommend medicine for pain.  Follow these instructions at home:  If you have a splint:  · Wear the splint as told by your doctor. Take it off only as told by your doctor.  · Loosen the splint if your fingers or toes tingle, get numb, or turn cold and blue.  · Keep the splint clean.  · If the splint is not waterproof:  ? Do not let it get wet.  ? Cover it with a watertight covering when you take a bath or a shower.  Managing pain, stiffness, and swelling    · If directed, put ice on your injured area.  ? If you have a removable splint, take it off as told by your doctor.  ? Put ice in a plastic bag.  ? Place a towel between your skin and the bag.  ? Leave the ice on for 20 minutes, 2-3 times a day.  · Move your fingers or toes often. This helps to avoid stiffness and lessen swelling.  · Raise your injured area above the level of your heart while you are sitting or lying down.  · Wear an elastic bandage as told by your doctor. Make sure it is not too tight.  General instructions  · Take over-the-counter and prescription medicines only as told by your doctor.  · Limit your activity. Rest your injured muscle as told by your doctor. Your doctor may say that gentle movements are okay.  · If physical therapy was prescribed, do exercises as told by your doctor.  · Do not put pressure on any  part of the splint until it is fully hardened. This may take many hours.  · Do not use any products that contain nicotine or tobacco, such as cigarettes and e-cigarettes. These can delay bone healing. If you need help quitting, ask your doctor.  · Warm up before you exercise. This helps to prevent more muscle strains.  · Ask your doctor when it is safe to drive if you have a splint.  · Keep all follow-up visits as told by your doctor. This is important.  Contact a doctor if:  · You have more pain or swelling in your injured area.  Get help right away if:  · You have any of these problems in your injured area:  ? You have numbness.  ? You have tingling.  ? You lose a lot of strength.  Summary  · A muscle strain is an injury that happens when a muscle is stretched longer than normal.  · This condition is first treated with PRICE therapy. This includes protecting, resting, icing, adding pressure, and raising your injury.  · Limit your activity. Rest your injured muscle as told by your doctor. Your doctor may say that gentle movements are okay.  · Warm up before you exercise. This helps to prevent more muscle strains.  This information is not intended to replace advice given to you by your health care provider. Make sure you discuss any questions you have with your health care provider.  Document Revised: 02/13/2020 Document Reviewed: 01/24/2018  Elsevier Patient Education © 2020 [employer]

## 2021-06-14 ENCOUNTER — OFFICE VISIT (OUTPATIENT)
Dept: OBSTETRICS AND GYNECOLOGY | Facility: CLINIC | Age: 31
End: 2021-06-14

## 2021-06-14 VITALS
WEIGHT: 156 LBS | DIASTOLIC BLOOD PRESSURE: 72 MMHG | SYSTOLIC BLOOD PRESSURE: 106 MMHG | HEIGHT: 65 IN | BODY MASS INDEX: 25.99 KG/M2

## 2021-06-14 DIAGNOSIS — N92.0 SPOTTING: ICD-10-CM

## 2021-06-14 DIAGNOSIS — Z01.419 WELL WOMAN EXAM WITH ROUTINE GYNECOLOGICAL EXAM: Primary | ICD-10-CM

## 2021-06-14 DIAGNOSIS — Z12.4 ENCOUNTER FOR PAPANICOLAOU SMEAR OF CERVIX: ICD-10-CM

## 2021-06-14 LAB
CANDIDA ALBICANS: NEGATIVE
GARDNERELLA VAGINALIS: NEGATIVE
T VAGINALIS DNA VAG QL PROBE+SIG AMP: NEGATIVE

## 2021-06-14 PROCEDURE — 87480 CANDIDA DNA DIR PROBE: CPT | Performed by: NURSE PRACTITIONER

## 2021-06-14 PROCEDURE — 87510 GARDNER VAG DNA DIR PROBE: CPT | Performed by: NURSE PRACTITIONER

## 2021-06-14 PROCEDURE — 87661 TRICHOMONAS VAGINALIS AMPLIF: CPT | Performed by: NURSE PRACTITIONER

## 2021-06-14 PROCEDURE — 87491 CHLMYD TRACH DNA AMP PROBE: CPT | Performed by: NURSE PRACTITIONER

## 2021-06-14 PROCEDURE — 87660 TRICHOMONAS VAGIN DIR PROBE: CPT | Performed by: NURSE PRACTITIONER

## 2021-06-14 PROCEDURE — 87591 N.GONORRHOEAE DNA AMP PROB: CPT | Performed by: NURSE PRACTITIONER

## 2021-06-14 PROCEDURE — 99395 PREV VISIT EST AGE 18-39: CPT | Performed by: NURSE PRACTITIONER

## 2021-06-15 LAB
C TRACH RRNA CVX QL NAA+PROBE: NEGATIVE
N GONORRHOEA RRNA SPEC QL NAA+PROBE: NEGATIVE
TRICHOMONAS VAGINALIS PCR: NEGATIVE

## 2021-06-15 NOTE — PROGRESS NOTES
Subjective   Marcia Bradley is a 31 y.o. here for gyn annual    Marcia Franklin is a 31 yr old  who presents today for her gyn annual. Pt c/o of spotting with sex and and this has occurred twice since the of May. Hx of tubal ligation. LMP 2021.     Gynecologic Exam  The patient's pertinent negatives include no pelvic pain or vaginal discharge. Primary symptoms comment: spotting. This is a new problem. The current episode started 1 to 4 weeks ago. The problem occurs intermittently. The patient is experiencing no pain. Pertinent negatives include no abdominal pain, chills, constipation, diarrhea, dysuria, fever, frequency, nausea, rash or sore throat. She is sexually active. No, her partner does not have an STD. She uses tubal ligation for contraception. Her menstrual history has been regular. Her past medical history is significant for a  section and a gynecological surgery.       The following portions of the patient's history were reviewed and updated as appropriate: allergies, current medications, past family history, past medical history, past social history, past surgical history and problem list.    Review of Systems   Constitutional: Negative for chills, fatigue, fever, unexpected weight gain and unexpected weight loss.   HENT: Negative for sneezing and sore throat.    Respiratory: Negative for shortness of breath.    Cardiovascular: Negative for chest pain and palpitations.   Gastrointestinal: Negative for abdominal pain, constipation, diarrhea and nausea.   Endocrine: Negative for cold intolerance and heat intolerance.   Genitourinary: Negative for amenorrhea, breast discharge, breast lump, breast pain, difficulty urinating, dysuria, frequency, menstrual problem, pelvic pain, pelvic pressure, urinary incontinence, vaginal bleeding, vaginal discharge and vaginal pain.        Spotting   Skin: Negative for rash.   Neurological: Negative for weakness and headache.   Psychiatric/Behavioral:  Negative for sleep disturbance, depressed mood and stress.       Objective   Physical Exam  Vitals and nursing note reviewed. Exam conducted with a chaperone present.   Constitutional:       Appearance: She is well-developed.   HENT:      Head: Normocephalic and atraumatic.   Eyes:      Conjunctiva/sclera: Conjunctivae normal.   Neck:      Thyroid: No thyromegaly.   Cardiovascular:      Rate and Rhythm: Normal rate and regular rhythm.      Heart sounds: Normal heart sounds.   Pulmonary:      Effort: Pulmonary effort is normal. No respiratory distress.      Breath sounds: Normal breath sounds.   Chest:      Breasts:         Right: Normal. No swelling, bleeding, inverted nipple, mass, nipple discharge, skin change or tenderness.         Left: Normal. No swelling, bleeding, inverted nipple, mass, nipple discharge, skin change or tenderness.   Abdominal:      General: Bowel sounds are normal. There is no distension.      Palpations: Abdomen is soft.      Tenderness: There is no abdominal tenderness.   Genitourinary:     General: Normal vulva.      Labia:         Right: No rash, tenderness or injury.         Left: No rash, tenderness, lesion or injury.       Urethra: No prolapse, urethral pain, urethral swelling or urethral lesion.      Vagina: Normal.      Cervix: Normal.      Uterus: Normal.       Adnexa: Right adnexa normal and left adnexa normal.      Rectum: Normal.      Comments: Pap, vag panel, and G/C/T swabs collected.   Musculoskeletal:         General: Normal range of motion.      Cervical back: Normal range of motion and neck supple.   Skin:     General: Skin is warm and dry.   Neurological:      Mental Status: She is alert and oriented to person, place, and time.   Psychiatric:         Behavior: Behavior normal.           Assessment/Plan   Diagnoses and all orders for this visit:    1. Well woman exam with routine gynecological exam (Primary)    2. Encounter for Papanicolaou smear of cervix  -      Liquid-based Pap Smear, Screening    3. Spotting  -     Chlamydia trachomatis, Neisseria gonorrhoeae, Trichomonas vaginalis, PCR - Swab, Cervix  -     Gardnerella vaginalis, Trichomonas vaginalis, Candida albicans, DNA - Swab, Vagina        Reviewed pap smear screening guidelines, breast awareness, and annual mammogram starting at age 40 years old. Swabs to rule out infection due to spotting. If spotting persists, return. Return in one year.

## 2021-06-16 LAB
LAB AP CASE REPORT: NORMAL
PATH INTERP SPEC-IMP: NORMAL

## 2022-12-14 ENCOUNTER — OFFICE VISIT (OUTPATIENT)
Dept: FAMILY MEDICINE CLINIC | Facility: CLINIC | Age: 32
End: 2022-12-14

## 2022-12-14 ENCOUNTER — LAB (OUTPATIENT)
Dept: LAB | Facility: HOSPITAL | Age: 32
End: 2022-12-14

## 2022-12-14 VITALS
OXYGEN SATURATION: 99 % | HEIGHT: 65 IN | WEIGHT: 140.7 LBS | RESPIRATION RATE: 22 BRPM | DIASTOLIC BLOOD PRESSURE: 62 MMHG | HEART RATE: 84 BPM | BODY MASS INDEX: 23.44 KG/M2 | SYSTOLIC BLOOD PRESSURE: 112 MMHG

## 2022-12-14 DIAGNOSIS — E55.9 VITAMIN D DEFICIENCY: ICD-10-CM

## 2022-12-14 DIAGNOSIS — Z76.89 ENCOUNTER TO ESTABLISH CARE: ICD-10-CM

## 2022-12-14 DIAGNOSIS — D50.9 IRON DEFICIENCY ANEMIA, UNSPECIFIED IRON DEFICIENCY ANEMIA TYPE: ICD-10-CM

## 2022-12-14 DIAGNOSIS — Z76.89 ENCOUNTER TO ESTABLISH CARE: Primary | ICD-10-CM

## 2022-12-14 LAB
25(OH)D3 SERPL-MCNC: 43.4 NG/ML (ref 30–100)
ALBUMIN SERPL-MCNC: 5 G/DL (ref 3.5–5.2)
ALBUMIN/GLOB SERPL: 2.5 G/DL
ALP SERPL-CCNC: 46 U/L (ref 39–117)
ALT SERPL W P-5'-P-CCNC: 12 U/L (ref 1–33)
ANION GAP SERPL CALCULATED.3IONS-SCNC: 8.9 MMOL/L (ref 5–15)
AST SERPL-CCNC: 20 U/L (ref 1–32)
BASOPHILS # BLD AUTO: 0.04 10*3/MM3 (ref 0–0.2)
BASOPHILS NFR BLD AUTO: 0.7 % (ref 0–1.5)
BILIRUB SERPL-MCNC: 0.5 MG/DL (ref 0–1.2)
BUN SERPL-MCNC: 9 MG/DL (ref 6–20)
BUN/CREAT SERPL: 12.7 (ref 7–25)
CALCIUM SPEC-SCNC: 9.1 MG/DL (ref 8.6–10.5)
CHLORIDE SERPL-SCNC: 105 MMOL/L (ref 98–107)
CHOLEST SERPL-MCNC: 139 MG/DL (ref 0–200)
CO2 SERPL-SCNC: 26.1 MMOL/L (ref 22–29)
CREAT SERPL-MCNC: 0.71 MG/DL (ref 0.57–1)
DEPRECATED RDW RBC AUTO: 38.9 FL (ref 37–54)
EGFRCR SERPLBLD CKD-EPI 2021: 115.3 ML/MIN/1.73
EOSINOPHIL # BLD AUTO: 0.12 10*3/MM3 (ref 0–0.4)
EOSINOPHIL NFR BLD AUTO: 2.2 % (ref 0.3–6.2)
ERYTHROCYTE [DISTWIDTH] IN BLOOD BY AUTOMATED COUNT: 13.8 % (ref 12.3–15.4)
GLOBULIN UR ELPH-MCNC: 2 GM/DL
GLUCOSE SERPL-MCNC: 97 MG/DL (ref 65–99)
HBA1C MFR BLD: 5.3 % (ref 4.8–5.6)
HCT VFR BLD AUTO: 32 % (ref 34–46.6)
HDLC SERPL-MCNC: 65 MG/DL (ref 40–60)
HGB BLD-MCNC: 9.9 G/DL (ref 12–15.9)
IMM GRANULOCYTES # BLD AUTO: 0.01 10*3/MM3 (ref 0–0.05)
IMM GRANULOCYTES NFR BLD AUTO: 0.2 % (ref 0–0.5)
IRON 24H UR-MRATE: 29 MCG/DL (ref 37–145)
IRON SATN MFR SERPL: 6 % (ref 20–50)
LDLC SERPL CALC-MCNC: 62 MG/DL (ref 0–100)
LDLC/HDLC SERPL: 0.96 {RATIO}
LYMPHOCYTES # BLD AUTO: 1.48 10*3/MM3 (ref 0.7–3.1)
LYMPHOCYTES NFR BLD AUTO: 27.6 % (ref 19.6–45.3)
MCH RBC QN AUTO: 24.3 PG (ref 26.6–33)
MCHC RBC AUTO-ENTMCNC: 30.9 G/DL (ref 31.5–35.7)
MCV RBC AUTO: 78.6 FL (ref 79–97)
MONOCYTES # BLD AUTO: 0.37 10*3/MM3 (ref 0.1–0.9)
MONOCYTES NFR BLD AUTO: 6.9 % (ref 5–12)
NEUTROPHILS NFR BLD AUTO: 3.34 10*3/MM3 (ref 1.7–7)
NEUTROPHILS NFR BLD AUTO: 62.4 % (ref 42.7–76)
NRBC BLD AUTO-RTO: 0 /100 WBC (ref 0–0.2)
PLATELET # BLD AUTO: 253 10*3/MM3 (ref 140–450)
PMV BLD AUTO: 12.2 FL (ref 6–12)
POTASSIUM SERPL-SCNC: 4.1 MMOL/L (ref 3.5–5.2)
PROT SERPL-MCNC: 7 G/DL (ref 6–8.5)
RBC # BLD AUTO: 4.07 10*6/MM3 (ref 3.77–5.28)
SODIUM SERPL-SCNC: 140 MMOL/L (ref 136–145)
TIBC SERPL-MCNC: 510 MCG/DL (ref 298–536)
TRANSFERRIN SERPL-MCNC: 342 MG/DL (ref 200–360)
TRIGL SERPL-MCNC: 59 MG/DL (ref 0–150)
TSH SERPL DL<=0.05 MIU/L-ACNC: 0.97 UIU/ML (ref 0.27–4.2)
VIT B12 BLD-MCNC: 606 PG/ML (ref 211–946)
VLDLC SERPL-MCNC: 12 MG/DL (ref 5–40)
WBC NRBC COR # BLD: 5.36 10*3/MM3 (ref 3.4–10.8)

## 2022-12-14 PROCEDURE — 80050 GENERAL HEALTH PANEL: CPT

## 2022-12-14 PROCEDURE — 36415 COLL VENOUS BLD VENIPUNCTURE: CPT

## 2022-12-14 PROCEDURE — 82607 VITAMIN B-12: CPT

## 2022-12-14 PROCEDURE — 99213 OFFICE O/P EST LOW 20 MIN: CPT | Performed by: NURSE PRACTITIONER

## 2022-12-14 PROCEDURE — 82306 VITAMIN D 25 HYDROXY: CPT

## 2022-12-14 PROCEDURE — 84466 ASSAY OF TRANSFERRIN: CPT

## 2022-12-14 PROCEDURE — 83540 ASSAY OF IRON: CPT

## 2022-12-14 PROCEDURE — 80061 LIPID PANEL: CPT

## 2022-12-14 PROCEDURE — 83036 HEMOGLOBIN GLYCOSYLATED A1C: CPT

## 2022-12-14 NOTE — PROGRESS NOTES
"Chief Complaint  Establish Care    Subjective          Marcia Bradley presents to [facility] [place] to establish care. Has a history of iron deficiency as well as vit d deficiency. She was previously on iron supplements and then stopped. She is feeling more fatigued and thinks her iron may be low again.       Fatigue  This is a chronic problem. The current episode started more than 1 year ago. The problem occurs daily. The problem has been waxing and waning. Associated symptoms include fatigue. Exacerbated by: low iron. She has tried rest for the symptoms. The treatment provided no relief.     Outpatient Medications Prior to Visit   Medication Sig Dispense Refill   • cyclobenzaprine (FLEXERIL) 10 MG tablet      • ibuprofen (ADVIL,MOTRIN) 600 MG tablet      • predniSONE (DELTASONE) 10 MG (48) dose pack Take as directed on package 21 tablet 0     No facility-administered medications prior to visit.       Review of Systems   Constitutional: Positive for fatigue.         Objective   Vital Signs:   Visit Vitals  /62 (BP Location: Right arm, Patient Position: Sitting, Cuff Size: Adult)   Pulse 84   Resp 22   Ht 165.1 cm (65\")   Wt 63.8 kg (140 lb 11.2 oz)   SpO2 99%   Breastfeeding No   BMI 23.41 kg/m²     Physical Exam  Vitals and nursing note reviewed.   Constitutional:       Appearance: She is well-developed.   HENT:      Head: Normocephalic and atraumatic.   Eyes:      General: Lids are normal.      Conjunctiva/sclera: Conjunctivae normal.   Neck:      Thyroid: No thyroid mass or thyromegaly.      Trachea: Trachea normal. No tracheal tenderness.   Cardiovascular:      Rate and Rhythm: Normal rate.      Pulses: Normal pulses.      Heart sounds: Normal heart sounds.   Pulmonary:      Effort: Pulmonary effort is normal. No respiratory distress.      Breath sounds: Normal breath sounds. No wheezing.   Abdominal:      General: There is no distension.      Palpations: " Abdomen is soft. There is no mass.   Musculoskeletal:         General: Normal range of motion.      Cervical back: Normal range of motion. No edema.   Skin:     General: Skin is warm and dry.      Coloration: Skin is not pale.      Findings: No abrasion, erythema or lesion.   Neurological:      Mental Status: She is alert and oriented to person, place, and time.   Psychiatric:         Mood and Affect: Mood is not anxious. Affect is not inappropriate.         Speech: Speech normal.         Behavior: Behavior normal.         Thought Content: Thought content normal.         Judgment: Judgment normal. Judgment is not impulsive.        Result Review :                 Assessment and Plan    Diagnoses and all orders for this visit:    1. Encounter to establish care (Primary)  -     TSH; Future  -     Vitamin D,25-Hydroxy; Future  -     Comprehensive Metabolic Panel; Future  -     Hemoglobin A1c; Future  -     CBC & Differential; Future  -     Vitamin B12; Future  -     Lipid Panel; Future  -     Iron Profile; Future    2. Iron deficiency anemia, unspecified iron deficiency anemia type  -     TSH; Future  -     Vitamin D,25-Hydroxy; Future  -     Comprehensive Metabolic Panel; Future  -     Hemoglobin A1c; Future  -     CBC & Differential; Future  -     Vitamin B12; Future  -     Lipid Panel; Future  -     Iron Profile; Future    3. Vitamin D deficiency  -     TSH; Future  -     Vitamin D,25-Hydroxy; Future  -     Comprehensive Metabolic Panel; Future  -     Hemoglobin A1c; Future  -     CBC & Differential; Future  -     Vitamin B12; Future  -     Lipid Panel; Future  -     Iron Profile; Future      Complete ordered lab work   We will call with results    She is currently taking otc iron supplement with vit c     Please call the office if you have any issues    Once lab results are back, we will make a plan regarding type of treatment.         Follow Up   Return if symptoms worsen or fail to improve.  Patient was given  instructions and counseling regarding her condition or for health maintenance advice. Please see specific information pulled into the AVS if appropriate.           This document has been electronically signed by GURPREET Becker on December 14, 2022 10:58 CST

## 2022-12-16 ENCOUNTER — TELEPHONE (OUTPATIENT)
Dept: FAMILY MEDICINE CLINIC | Facility: CLINIC | Age: 32
End: 2022-12-16

## 2022-12-16 NOTE — TELEPHONE ENCOUNTER
PT WOULD LIKE TO DISCUSS LAB RESULTS. PT WOULD ALSO LIKE TO DISCUSS TESTING FOR LEUKEMIA DUE TO FAMILY HISTORY

## 2022-12-22 DIAGNOSIS — E55.9 VITAMIN D DEFICIENCY: Primary | ICD-10-CM

## 2022-12-22 RX ORDER — ASCORBIC ACID 500 MG
500 TABLET ORAL DAILY
Qty: 30 TABLET | Refills: 11 | Status: SHIPPED | OUTPATIENT
Start: 2022-12-22 | End: 2023-01-04 | Stop reason: SDUPTHER

## 2022-12-22 RX ORDER — DOXYCYCLINE HYCLATE 50 MG/1
324 CAPSULE, GELATIN COATED ORAL
Qty: 30 TABLET | Refills: 11 | Status: SHIPPED | OUTPATIENT
Start: 2022-12-22 | End: 2023-01-04 | Stop reason: SDUPTHER

## 2023-01-04 ENCOUNTER — TELEPHONE (OUTPATIENT)
Dept: FAMILY MEDICINE CLINIC | Facility: CLINIC | Age: 33
End: 2023-01-04
Payer: COMMERCIAL

## 2023-01-04 DIAGNOSIS — E55.9 VITAMIN D DEFICIENCY: ICD-10-CM

## 2023-01-04 RX ORDER — ASCORBIC ACID 500 MG
500 TABLET ORAL DAILY
Qty: 30 TABLET | Refills: 11 | Status: SHIPPED | OUTPATIENT
Start: 2023-01-04

## 2023-01-04 RX ORDER — DOXYCYCLINE HYCLATE 50 MG/1
324 CAPSULE, GELATIN COATED ORAL
Qty: 30 TABLET | Refills: 11 | Status: SHIPPED | OUTPATIENT
Start: 2023-01-04

## 2023-01-04 NOTE — TELEPHONE ENCOUNTER
Patient has been called and New Rx has been sent to [facility]    ----- Message from Marcia Bradley sent at 1/4/2023 12:47 PM CST -----  Regarding: Pharmacy problems   Contact: [phone]  Hey I’ve been having a little trouble getting my medicine from the pharmacy. I told them at [facility] I would like to use [facility] on [address]. Is there anyway anyone can update that on the system so my meds go to [facility] instead of [facility], please ?  Thank you !

## 2023-01-16 ENCOUNTER — OFFICE VISIT (OUTPATIENT)
Dept: FAMILY MEDICINE CLINIC | Facility: CLINIC | Age: 33
End: 2023-01-16
Payer: COMMERCIAL

## 2023-01-16 ENCOUNTER — LAB (OUTPATIENT)
Dept: LAB | Facility: HOSPITAL | Age: 33
End: 2023-01-16
Payer: COMMERCIAL

## 2023-01-16 VITALS
SYSTOLIC BLOOD PRESSURE: 118 MMHG | HEART RATE: 68 BPM | RESPIRATION RATE: 18 BRPM | OXYGEN SATURATION: 98 % | WEIGHT: 139.4 LBS | TEMPERATURE: 97.1 F | HEIGHT: 65 IN | BODY MASS INDEX: 23.22 KG/M2 | DIASTOLIC BLOOD PRESSURE: 82 MMHG

## 2023-01-16 DIAGNOSIS — E55.9 VITAMIN D DEFICIENCY: ICD-10-CM

## 2023-01-16 DIAGNOSIS — D50.9 IRON DEFICIENCY ANEMIA, UNSPECIFIED IRON DEFICIENCY ANEMIA TYPE: ICD-10-CM

## 2023-01-16 DIAGNOSIS — R10.9 ABDOMINAL PAIN, UNSPECIFIED ABDOMINAL LOCATION: ICD-10-CM

## 2023-01-16 DIAGNOSIS — K21.9 GASTROESOPHAGEAL REFLUX DISEASE, UNSPECIFIED WHETHER ESOPHAGITIS PRESENT: Primary | ICD-10-CM

## 2023-01-16 DIAGNOSIS — N92.6 MENSTRUAL ABNORMALITY: ICD-10-CM

## 2023-01-16 DIAGNOSIS — K21.9 GASTROESOPHAGEAL REFLUX DISEASE, UNSPECIFIED WHETHER ESOPHAGITIS PRESENT: ICD-10-CM

## 2023-01-16 LAB
ALBUMIN SERPL-MCNC: 4.8 G/DL (ref 3.5–5.2)
ALBUMIN/GLOB SERPL: 2.3 G/DL
ALP SERPL-CCNC: 48 U/L (ref 39–117)
ALT SERPL W P-5'-P-CCNC: 13 U/L (ref 1–33)
ANION GAP SERPL CALCULATED.3IONS-SCNC: 8 MMOL/L (ref 5–15)
AST SERPL-CCNC: 20 U/L (ref 1–32)
BASOPHILS # BLD AUTO: 0.05 10*3/MM3 (ref 0–0.2)
BASOPHILS NFR BLD AUTO: 0.9 % (ref 0–1.5)
BILIRUB SERPL-MCNC: 0.5 MG/DL (ref 0–1.2)
BUN SERPL-MCNC: 13 MG/DL (ref 6–20)
BUN/CREAT SERPL: 18.6 (ref 7–25)
CALCIUM SPEC-SCNC: 9 MG/DL (ref 8.6–10.5)
CHLORIDE SERPL-SCNC: 107 MMOL/L (ref 98–107)
CO2 SERPL-SCNC: 26 MMOL/L (ref 22–29)
CREAT SERPL-MCNC: 0.7 MG/DL (ref 0.57–1)
DEPRECATED RDW RBC AUTO: 44.2 FL (ref 37–54)
EGFRCR SERPLBLD CKD-EPI 2021: 117.3 ML/MIN/1.73
EOSINOPHIL # BLD AUTO: 0.12 10*3/MM3 (ref 0–0.4)
EOSINOPHIL NFR BLD AUTO: 2.2 % (ref 0.3–6.2)
ERYTHROCYTE [DISTWIDTH] IN BLOOD BY AUTOMATED COUNT: 16.5 % (ref 12.3–15.4)
GLOBULIN UR ELPH-MCNC: 2.1 GM/DL
GLUCOSE SERPL-MCNC: 84 MG/DL (ref 65–99)
HCT VFR BLD AUTO: 32.3 % (ref 34–46.6)
HGB BLD-MCNC: 10.2 G/DL (ref 12–15.9)
IMM GRANULOCYTES # BLD AUTO: 0.02 10*3/MM3 (ref 0–0.05)
IMM GRANULOCYTES NFR BLD AUTO: 0.4 % (ref 0–0.5)
IRON 24H UR-MRATE: 24 MCG/DL (ref 37–145)
IRON SATN MFR SERPL: 5 % (ref 20–50)
LYMPHOCYTES # BLD AUTO: 1.05 10*3/MM3 (ref 0.7–3.1)
LYMPHOCYTES NFR BLD AUTO: 19.4 % (ref 19.6–45.3)
MCH RBC QN AUTO: 24.8 PG (ref 26.6–33)
MCHC RBC AUTO-ENTMCNC: 31.6 G/DL (ref 31.5–35.7)
MCV RBC AUTO: 78.4 FL (ref 79–97)
MONOCYTES # BLD AUTO: 0.4 10*3/MM3 (ref 0.1–0.9)
MONOCYTES NFR BLD AUTO: 7.4 % (ref 5–12)
NEUTROPHILS NFR BLD AUTO: 3.78 10*3/MM3 (ref 1.7–7)
NEUTROPHILS NFR BLD AUTO: 69.7 % (ref 42.7–76)
NRBC BLD AUTO-RTO: 0 /100 WBC (ref 0–0.2)
PLATELET # BLD AUTO: 267 10*3/MM3 (ref 140–450)
PMV BLD AUTO: 12.2 FL (ref 6–12)
POTASSIUM SERPL-SCNC: 4 MMOL/L (ref 3.5–5.2)
PROT SERPL-MCNC: 6.9 G/DL (ref 6–8.5)
RBC # BLD AUTO: 4.12 10*6/MM3 (ref 3.77–5.28)
SODIUM SERPL-SCNC: 141 MMOL/L (ref 136–145)
TIBC SERPL-MCNC: 469 MCG/DL (ref 298–536)
TRANSFERRIN SERPL-MCNC: 315 MG/DL (ref 200–360)
TSH SERPL DL<=0.05 MIU/L-ACNC: 0.98 UIU/ML (ref 0.27–4.2)
VIT B12 BLD-MCNC: 618 PG/ML (ref 211–946)
WBC NRBC COR # BLD: 5.42 10*3/MM3 (ref 3.4–10.8)

## 2023-01-16 PROCEDURE — 82784 ASSAY IGA/IGD/IGG/IGM EACH: CPT

## 2023-01-16 PROCEDURE — 99214 OFFICE O/P EST MOD 30 MIN: CPT | Performed by: NURSE PRACTITIONER

## 2023-01-16 PROCEDURE — 82785 ASSAY OF IGE: CPT

## 2023-01-16 PROCEDURE — 36415 COLL VENOUS BLD VENIPUNCTURE: CPT

## 2023-01-16 PROCEDURE — 86003 ALLG SPEC IGE CRUDE XTRC EA: CPT

## 2023-01-16 PROCEDURE — 83540 ASSAY OF IRON: CPT

## 2023-01-16 PROCEDURE — 86008 ALLG SPEC IGE RECOMB EA: CPT

## 2023-01-16 PROCEDURE — 80050 GENERAL HEALTH PANEL: CPT

## 2023-01-16 PROCEDURE — 86364 TISS TRNSGLTMNASE EA IG CLAS: CPT

## 2023-01-16 PROCEDURE — 86231 EMA EACH IG CLASS: CPT

## 2023-01-16 PROCEDURE — 82607 VITAMIN B-12: CPT

## 2023-01-16 PROCEDURE — 84466 ASSAY OF TRANSFERRIN: CPT

## 2023-01-16 RX ORDER — OMEPRAZOLE 20 MG/1
20 CAPSULE, DELAYED RELEASE ORAL DAILY
Qty: 34 CAPSULE | Refills: 11 | Status: SHIPPED | OUTPATIENT
Start: 2023-01-16

## 2023-01-16 NOTE — PROGRESS NOTES
Chief Complaint  Follow-up, Menorrhagia (Fmla papers needed), and Abdominal Pain    Subjective          Marcia Bradley presents to [facility] [place] for follow up. She has been having issues with her menstrual cycle and is following with ob/gyn at the end of the month. She has been having lower abdominal pain that seems to become worse around her cycle, she has an appt with her gyn to discuss this. At times she has a feeling that if she could burp she would feel better, tums seem to help a little.         Fatigue  This is a chronic problem. The current episode started more than 1 year ago. The problem occurs daily. The problem has been waxing and waning. Associated symptoms include abdominal pain, fatigue, myalgias and weakness. Exacerbated by: low iron. She has tried rest for the symptoms. The treatment provided no relief.   Menstrual Problem  This is a recurrent problem. The current episode started more than 1 month ago. The problem occurs intermittently. The problem has been gradually worsening. Associated symptoms include abdominal pain, fatigue, myalgias and weakness. Nothing aggravates the symptoms.   Abdominal Pain  This is a recurrent problem. The current episode started more than 1 year ago. The onset quality is undetermined. The problem occurs intermittently. The problem has been waxing and waning. The pain is located in the LLQ and RLQ. The pain is at a severity of 6/10. The quality of the pain is aching and cramping. Associated symptoms include myalgias. Exacerbated by: menstrual cycle  The pain is relieved by belching. She has tried antacids for the symptoms. The treatment provided mild relief.     Outpatient Medications Prior to Visit   Medication Sig Dispense Refill   • ferrous gluconate (FERGON) 324 MG tablet Take 1 tablet by mouth Daily With Breakfast. 30 tablet 11   • vitamin C (ASCORBIC ACID) 500 MG tablet Take 1 tablet by mouth Daily. 30 tablet  "11     No facility-administered medications prior to visit.       Review of Systems   Constitutional: Positive for fatigue.   Gastrointestinal: Positive for abdominal pain.   Genitourinary: Positive for menstrual problem.   Musculoskeletal: Positive for myalgias.   Neurological: Positive for weakness.         Objective   Vital Signs:   Visit Vitals  /82   Pulse 68   Temp 97.1 °F (36.2 °C) (Infrared)   Resp 18   Ht 165.1 cm (65\")   Wt 63.2 kg (139 lb 6.4 oz)   SpO2 98%   BMI 23.20 kg/m²     Physical Exam  Vitals and nursing note reviewed.   Constitutional:       Appearance: She is well-developed.   HENT:      Head: Normocephalic and atraumatic.   Eyes:      General: Lids are normal.      Conjunctiva/sclera: Conjunctivae normal.   Neck:      Thyroid: No thyroid mass or thyromegaly.      Trachea: Trachea normal. No tracheal tenderness.   Cardiovascular:      Rate and Rhythm: Normal rate.      Pulses: Normal pulses.      Heart sounds: Normal heart sounds.   Pulmonary:      Effort: Pulmonary effort is normal. No respiratory distress.      Breath sounds: Normal breath sounds. No wheezing.   Abdominal:      General: Bowel sounds are normal. There is no distension.      Palpations: Abdomen is soft. There is no mass.   Musculoskeletal:         General: Normal range of motion.      Cervical back: Normal range of motion. No edema.   Skin:     General: Skin is warm and dry.      Coloration: Skin is not pale.      Findings: No abrasion, erythema or lesion.   Neurological:      Mental Status: She is alert and oriented to person, place, and time.   Psychiatric:         Mood and Affect: Mood is not anxious. Affect is not inappropriate.         Speech: Speech normal.         Behavior: Behavior normal.         Thought Content: Thought content normal.         Judgment: Judgment normal. Judgment is not impulsive.        Result Review :                 Assessment and Plan    Diagnoses and all orders for this visit:    1. " Gastroesophageal reflux disease, unspecified whether esophagitis present (Primary)  -     omeprazole (priLOSEC) 20 MG capsule; Take 1 capsule by mouth Daily.  Dispense: 34 capsule; Refill: 11  -     Iron Profile; Future  -     TSH; Future  -     Comprehensive Metabolic Panel; Future  -     Cancel: Hemoglobin A1c; Future  -     CBC & Differential; Future  -     Vitamin B12; Future  -     Celiac Disease Panel; Future  -     Alpha-Gal IgE Panel; Future    2. Menstrual abnormality  -     Iron Profile; Future  -     TSH; Future  -     Comprehensive Metabolic Panel; Future  -     Cancel: Hemoglobin A1c; Future  -     CBC & Differential; Future  -     Vitamin B12; Future  -     Celiac Disease Panel; Future  -     Alpha-Gal IgE Panel; Future    3. Vitamin D deficiency  -     Iron Profile; Future  -     TSH; Future  -     Comprehensive Metabolic Panel; Future  -     Cancel: Hemoglobin A1c; Future  -     CBC & Differential; Future  -     Vitamin B12; Future  -     Celiac Disease Panel; Future  -     Alpha-Gal IgE Panel; Future    4. Iron deficiency anemia, unspecified iron deficiency anemia type  -     Iron Profile; Future  -     TSH; Future  -     Comprehensive Metabolic Panel; Future  -     Cancel: Hemoglobin A1c; Future  -     CBC & Differential; Future  -     Vitamin B12; Future  -     Celiac Disease Panel; Future  -     Alpha-Gal IgE Panel; Future    5. Abdominal pain, unspecified abdominal location  -     Iron Profile; Future  -     TSH; Future  -     Comprehensive Metabolic Panel; Future  -     Cancel: Hemoglobin A1c; Future  -     CBC & Differential; Future  -     Vitamin B12; Future  -     Celiac Disease Panel; Future  -     Alpha-Gal IgE Panel; Future       Complete ordered lab work   We will call with results    We will start Prilosec to see if this helps indigestion symptoms     Continue to follow with obgyn     Continue current medications for now     Please call the office if you have any issues, questions or  concerns       Follow Up   Return in about 4 weeks (around 2/13/2023), or if symptoms worsen or fail to improve, for Next scheduled follow up.  Patient was given instructions and counseling regarding her condition or for health maintenance advice. Please see specific information pulled into the AVS if appropriate.           This document has been electronically signed by GURPREET Becker on January 17, 2023 12:53 CST

## 2023-01-17 LAB
ENDOMYSIUM IGA SER QL: NEGATIVE
IGA SERPL-MCNC: 128 MG/DL (ref 87–352)
TTG IGA SER-ACNC: <2 U/ML (ref 0–3)

## 2023-01-20 ENCOUNTER — TELEPHONE (OUTPATIENT)
Dept: FAMILY MEDICINE CLINIC | Facility: CLINIC | Age: 33
End: 2023-01-20
Payer: COMMERCIAL

## 2023-01-20 LAB
ALPHA-GAL IGE QN: <0.1 KU/L
BEEF IGE QN: <0.1 KU/L
CONV CLASS DESCRIPTION: NORMAL
IGE SERPL-ACNC: 16 IU/ML (ref 6–495)
LAMB IGE QN: <0.1 KU/L
PORK IGE QN: <0.1 KU/L

## 2023-01-20 NOTE — TELEPHONE ENCOUNTER
Per GURPREET Eddy, Ms. Bradley has been called with recent lab results & recommendations.  Continue current medications and follow-up as planned or sooner if any problems.     She said she has only been taking the iron about 2 weeks.  She said there was a mix up with her Pharmacy and she was not able to get it.  But yes, she is taking it daily with no problems.     She was asking about her FMLA papers and wanted to know if they had been completed and faxed back yet?   She would like to be called when this has been completed.       ----- Message from GURPREET Becker sent at 1/19/2023  3:00 PM CST -----  Will you ask patient if she has been taking her iron? Her labs show she is still pretty low. If her iron bothers her, I can send referral to hematology for her to get transfusion.

## 2023-01-20 NOTE — TELEPHONE ENCOUNTER
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

## 2023-01-20 NOTE — PROGRESS NOTES
Per GURPREET Eddy, Ms. Bradley has been called with recent lab results & recommendations.  Continue current medications and follow-up as planned or sooner if any problems.     She said she has only been taking the iron about 2 weeks.  She said there was a mix up with her Pharmacy and she was not able to get it.  But yes, she is taking it daily with no problems.     She was asking about her FMLA papers and wanted to know if they had been completed and faxed back yet?   She would like to be called when this has been completed.

## 2023-02-14 ENCOUNTER — OFFICE VISIT (OUTPATIENT)
Dept: OBSTETRICS AND GYNECOLOGY | Facility: CLINIC | Age: 33
End: 2023-02-14
Payer: COMMERCIAL

## 2023-02-14 ENCOUNTER — LAB (OUTPATIENT)
Dept: LAB | Facility: HOSPITAL | Age: 33
End: 2023-02-14
Payer: COMMERCIAL

## 2023-02-14 VITALS
HEIGHT: 65 IN | DIASTOLIC BLOOD PRESSURE: 70 MMHG | WEIGHT: 140 LBS | BODY MASS INDEX: 23.32 KG/M2 | SYSTOLIC BLOOD PRESSURE: 118 MMHG

## 2023-02-14 DIAGNOSIS — N93.9 ABNORMAL UTERINE BLEEDING (AUB): ICD-10-CM

## 2023-02-14 DIAGNOSIS — N92.0 MENORRHAGIA WITH REGULAR CYCLE: Primary | ICD-10-CM

## 2023-02-14 DIAGNOSIS — N94.6 DYSMENORRHEA: ICD-10-CM

## 2023-02-14 LAB
ESTRADIOL SERPL HS-MCNC: 53.4 PG/ML
FSH SERPL-ACNC: 11.1 MIU/ML
LH SERPL-ACNC: 9.29 MIU/ML

## 2023-02-14 PROCEDURE — 99214 OFFICE O/P EST MOD 30 MIN: CPT | Performed by: STUDENT IN AN ORGANIZED HEALTH CARE EDUCATION/TRAINING PROGRAM

## 2023-02-14 PROCEDURE — 36415 COLL VENOUS BLD VENIPUNCTURE: CPT | Performed by: STUDENT IN AN ORGANIZED HEALTH CARE EDUCATION/TRAINING PROGRAM

## 2023-02-14 PROCEDURE — 83525 ASSAY OF INSULIN: CPT

## 2023-02-14 PROCEDURE — 84403 ASSAY OF TOTAL TESTOSTERONE: CPT | Performed by: STUDENT IN AN ORGANIZED HEALTH CARE EDUCATION/TRAINING PROGRAM

## 2023-02-14 PROCEDURE — 82670 ASSAY OF TOTAL ESTRADIOL: CPT

## 2023-02-14 PROCEDURE — 83002 ASSAY OF GONADOTROPIN (LH): CPT

## 2023-02-14 PROCEDURE — 83001 ASSAY OF GONADOTROPIN (FSH): CPT

## 2023-02-14 PROCEDURE — 84402 ASSAY OF FREE TESTOSTERONE: CPT | Performed by: STUDENT IN AN ORGANIZED HEALTH CARE EDUCATION/TRAINING PROGRAM

## 2023-02-15 LAB — INSULIN SERPL-ACNC: 10.5 UIU/ML (ref 2.6–24.9)

## 2023-02-20 ENCOUNTER — OFFICE VISIT (OUTPATIENT)
Dept: FAMILY MEDICINE CLINIC | Facility: CLINIC | Age: 33
End: 2023-02-20
Payer: COMMERCIAL

## 2023-02-20 VITALS
DIASTOLIC BLOOD PRESSURE: 80 MMHG | HEIGHT: 65 IN | BODY MASS INDEX: 23.49 KG/M2 | WEIGHT: 141 LBS | HEART RATE: 63 BPM | OXYGEN SATURATION: 99 % | SYSTOLIC BLOOD PRESSURE: 126 MMHG

## 2023-02-20 DIAGNOSIS — R10.9 ABDOMINAL PAIN, UNSPECIFIED ABDOMINAL LOCATION: Primary | ICD-10-CM

## 2023-02-20 DIAGNOSIS — Z87.19 HX OF ULCERATIVE COLITIS: ICD-10-CM

## 2023-02-20 DIAGNOSIS — K21.9 GASTROESOPHAGEAL REFLUX DISEASE, UNSPECIFIED WHETHER ESOPHAGITIS PRESENT: ICD-10-CM

## 2023-02-20 LAB
TESTOST FREE SERPL-MCNC: 0.5 PG/ML (ref 0–4.2)
TESTOST SERPL-MCNC: 13 NG/DL (ref 8–60)

## 2023-02-20 PROCEDURE — 99214 OFFICE O/P EST MOD 30 MIN: CPT | Performed by: NURSE PRACTITIONER

## 2023-02-20 NOTE — PROGRESS NOTES
Chief Complaint  Follow-up (1 month )    Subjective          Marcia Bradley presents to [facility] [place] for one month follow-up.  She is still experiencing bloating, cramping, diarrhea.  Patient states this seems to be worse around her menstrual cycle.  She has noticed an increase and is requesting additional day for FMLA on her paperwork.  Fatigue  This is a chronic problem. The current episode started more than 1 year ago. The problem occurs daily. The problem has been waxing and waning. Associated symptoms include abdominal pain, fatigue, myalgias and weakness. Exacerbated by: low iron. She has tried rest for the symptoms. The treatment provided no relief.   Menstrual Problem  This is a recurrent problem. The current episode started more than 1 month ago. The problem occurs intermittently. The problem has been gradually worsening. Associated symptoms include abdominal pain, fatigue, myalgias and weakness. Nothing aggravates the symptoms.   Abdominal Pain  This is a recurrent problem. The current episode started more than 1 year ago. The onset quality is undetermined. The problem occurs intermittently. The problem has been waxing and waning. The pain is located in the LLQ and RLQ. The pain is at a severity of 6/10. The quality of the pain is aching and cramping. Associated symptoms include myalgias. Exacerbated by: menstrual cycle  The pain is relieved by belching. She has tried antacids for the symptoms. The treatment provided mild relief.     Outpatient Medications Prior to Visit   Medication Sig Dispense Refill   • ferrous gluconate (FERGON) 324 MG tablet Take 1 tablet by mouth Daily With Breakfast. 30 tablet 11   • omeprazole (priLOSEC) 20 MG capsule Take 1 capsule by mouth Daily. 34 capsule 11   • vitamin C (ASCORBIC ACID) 500 MG tablet Take 1 tablet by mouth Daily. 30 tablet 11     No facility-administered medications prior to visit.       Review of  "Systems   Constitutional: Positive for fatigue.   Gastrointestinal: Positive for abdominal pain.   Genitourinary: Positive for menstrual problem.   Musculoskeletal: Positive for myalgias.   Neurological: Positive for weakness.         Objective   Vital Signs:   Visit Vitals  /80 (BP Location: Left arm, Patient Position: Sitting, Cuff Size: Adult)   Pulse 63   Ht 165.1 cm (65\")   Wt 64 kg (141 lb)   LMP 02/13/2023 (Approximate)   SpO2 99%   BMI 23.46 kg/m²     Physical Exam  Vitals and nursing note reviewed.   Constitutional:       Appearance: She is well-developed.   HENT:      Head: Normocephalic and atraumatic.   Eyes:      General: Lids are normal.      Conjunctiva/sclera: Conjunctivae normal.   Neck:      Thyroid: No thyroid mass or thyromegaly.      Trachea: Trachea normal. No tracheal tenderness.   Cardiovascular:      Rate and Rhythm: Normal rate.      Pulses: Normal pulses.      Heart sounds: Normal heart sounds.   Pulmonary:      Effort: Pulmonary effort is normal. No respiratory distress.      Breath sounds: Normal breath sounds. No wheezing.   Abdominal:      General: Bowel sounds are increased. There is no distension.      Palpations: Abdomen is soft. There is no mass.   Musculoskeletal:         General: Normal range of motion.      Cervical back: Normal range of motion. No edema.   Skin:     General: Skin is warm and dry.      Coloration: Skin is not pale.      Findings: No abrasion, erythema or lesion.   Neurological:      Mental Status: She is alert and oriented to person, place, and time.   Psychiatric:         Mood and Affect: Mood is not anxious. Affect is not inappropriate.         Speech: Speech normal.         Behavior: Behavior normal.         Thought Content: Thought content normal.         Judgment: Judgment normal. Judgment is not impulsive.        Result Review :                 Assessment and Plan    Diagnoses and all orders for this visit:    1. Abdominal pain, unspecified abdominal " location (Primary)  -     Ambulatory Referral to Gastroenterology    2. Hx of ulcerative colitis  -     Ambulatory Referral to Gastroenterology    3. Gastroesophageal reflux disease, unspecified whether esophagitis present  -     Ambulatory Referral to Gastroenterology      Patient is currently following with OB/GYN to address menstrual issues    Referral to GI due to history of colitis in the past as well as GERD    Continue current medications  Please call the office if you have any questions or concerns      Follow Up   Return in about 3 months (around 5/20/2023), or if symptoms worsen or fail to improve.  Patient was given instructions and counseling regarding her condition or for health maintenance advice. Please see specific information pulled into the AVS if appropriate.           This document has been electronically signed by GURPREET Becker on February 21, 2023 07:08 CST

## 2023-06-13 ENCOUNTER — PROCEDURE VISIT (OUTPATIENT)
Dept: OBSTETRICS AND GYNECOLOGY | Facility: CLINIC | Age: 33
End: 2023-06-13
Payer: COMMERCIAL

## 2023-06-13 VITALS
SYSTOLIC BLOOD PRESSURE: 110 MMHG | WEIGHT: 137.8 LBS | BODY MASS INDEX: 22.96 KG/M2 | DIASTOLIC BLOOD PRESSURE: 78 MMHG | HEIGHT: 65 IN

## 2023-06-13 DIAGNOSIS — N91.2 AMENORRHEA: Primary | ICD-10-CM

## 2023-06-13 LAB
B-HCG UR QL: NEGATIVE
EXPIRATION DATE: NORMAL
INTERNAL NEGATIVE CONTROL: NORMAL
INTERNAL POSITIVE CONTROL: NORMAL
Lab: NORMAL

## 2023-06-13 PROCEDURE — 99214 OFFICE O/P EST MOD 30 MIN: CPT | Performed by: STUDENT IN AN ORGANIZED HEALTH CARE EDUCATION/TRAINING PROGRAM

## 2023-06-13 PROCEDURE — 81025 URINE PREGNANCY TEST: CPT | Performed by: STUDENT IN AN ORGANIZED HEALTH CARE EDUCATION/TRAINING PROGRAM

## 2023-06-13 PROCEDURE — 58100 BIOPSY OF UTERUS LINING: CPT | Performed by: STUDENT IN AN ORGANIZED HEALTH CARE EDUCATION/TRAINING PROGRAM

## 2023-06-13 RX ORDER — IBUPROFEN 800 MG/1
TABLET ORAL
COMMUNITY
Start: 2023-04-03 | End: 2023-07-27 | Stop reason: SDUPTHER

## 2023-06-13 NOTE — PROGRESS NOTES
[facility]  Gynecology  Date of Service: 2023    CC: EMB FU    [facility]  Marcia Bradley is a 33 y.o.  premenopausal female who presents with complaints of AUB, interested in ablation.       Periods worse overlast year. Patient with regular monthly periods, last up to 7 days, changes pads 4-6 times daily, sometimes flooding, passes clots. Cramping, especially day 2 and before, Midol and ibuprofen help some. Sometimes joint pain with period starting. No intermenstrual bleeding. No dyspareunia or bleeding after sex. ?h/o PCOS per patient. Other symptoms include mood changes, fatigue, reflux, diarrhea right around time of period. H/o colitis but states  colonoscopy normal. Can't take iron pills due to stomach ache and constipation. Sometimes has to use FMLA dueirng periods because so bothersome.    States should have had period last week, but missed. Usually 5-7 days, 2nd day changes pad/tampon every hour. Cramping x 1-2 days. Migraines sometimes.     21 NIL pap/hrHPV neg  23 TSH 0.977, Hgb 10.2, Plt 267  23 FSH, LH, Estradiol all WNL     Previously trialed BCP (forgot, unsure if effects but worries about weight gain); DMPA (made her more emotional).    23 Anteflexed 8.8 x 4.1 x 5.7 cm, hemogeneous, ES 0.7 cm, ROV 2.2 x 3 x 2.9 cm, LOV 3 x 1.9 x 1.6 cm       ROS  Review of Systems   Constitutional: Negative.    HENT: Negative.     Respiratory: Negative.     Cardiovascular: Negative.    Gastrointestinal: Negative.    Genitourinary:  Positive for menstrual problem.   Psychiatric/Behavioral: Negative.       GYN HISTORY  Menarche: 11 yo  Menses: see above  History of STIs: denies  Last pap smear:     Abnormal pap smear history: ?unsure if abnormal in past long ago, recheck normal, no procedures  Contraception: tubal  Last Completed Pap Smear            PAP SMEAR (Every 3 Years) Next due on 2021  Liquid-based Pap Smear, Screening    2017  IGP,  CtNg, Rfx Aptima HPV ASCU                    OB HISTORY  OB History    Para Term  AB Living   2 2 2     2   SAB IAB Ectopic Molar Multiple Live Births           0 2      # Outcome Date GA Lbr Jimmy/2nd Weight Sex Delivery Anes PTL Lv   2 Term 16 39w4d  4060 g (8 lb 15.2 oz) M CS-LTranv Spinal N FADUMO   1 Term 11 41w0d  3827 g (8 lb 7 oz) M CS-LTranv EPI N FADUMO     PAST MEDICAL HISTORY  Past Medical History:   Diagnosis Date    Allergic rhinitis     Anxiety     Depression      PAST SURGICAL HISTORY  Past Surgical History:   Procedure Laterality Date     SECTION       SECTION WITH TUBAL N/A 2017    Procedure:  SECTION REPEAT WITH TUBAL;  Surgeon: Grayson Vitale MD;  Location: [facility] LABOR DELIVERY;  Service:     WISDOM TOOTH EXTRACTION       FAMILY HISTORY  Family History   Problem Relation Age of Onset    Hypertension Mother     Anxiety disorder Mother     Hypertension Father     Other Sister     Cancer Maternal Grandmother     Stroke Paternal Grandmother     Heart disease Paternal Grandfather     Diabetes Paternal Grandfather     No Known Problems Half-Brother      SOCIAL HISTORY  Social History     Socioeconomic History    Marital status:    Tobacco Use    Smoking status: Former    Smokeless tobacco: Never   Vaping Use    Vaping Use: Former    Passive vaping exposure: Yes   Substance and Sexual Activity    Alcohol use: Yes     Comment: about 3 drinks a week, except during pregnancy    Drug use: No    Sexual activity: Yes     Partners: Male     Birth control/protection: None     ALLERGIES  No Known Allergies  HOME MEDICATIONS  Prior to Admission medications    Medication Sig Start Date End Date Taking? Authorizing Provider   ibuprofen (ADVIL,MOTRIN) 800 MG tablet  4/3/23  Yes Provider, MD Sarah   omeprazole (priLOSEC) 20 MG capsule Take 1 capsule by mouth Daily. 23  Yes Nunu López APRN   vitamin C (ASCORBIC ACID) 500 MG tablet Take 1  "tablet by mouth Daily. 1/4/23  Yes Rene, Nunu GURPREET   ferrous gluconate (FERGON) 324 MG tablet Take 1 tablet by mouth Daily With Breakfast.  Patient not taking: Reported on 6/13/2023 1/4/23   Nunu López APRN     PE  /78 (BP Location: Left arm, Patient Position: Sitting, Cuff Size: Adult)   Ht 165.1 cm (65\")   Wt 62.5 kg (137 lb 12.8 oz)   LMP 05/08/2023   BMI 22.93 kg/m²        General: Alert, healthy, no distress, well nourished and well developed.  Neurologic: Alert, oriented to person, place, and time.  Gait normal.  Cranial nerves II-XII grossly intact.  HEENT: Normocephalic, atraumatic.  Extraocular muscles intact.  Lungs: Normal respiratory effort.    Abdomen: Soft, non-tender, non-distended,no masses, no hepatosplenomegaly, no hernia.  Skin: No rash, no lesions.  Extremities: No cyanosis, clubbing or edema.  PELVIC EXAM:  External Genitalia/Vulva: Anatomy is normal, no significant redness of labia, no discharge on vulvar tissues, Agar's and Bartholin's glands are normal, no ulcers, no condylomatous lesions.  Urethral meatus: Normal, no lesions, no prolapse.  Urethra: Normal, no masses, no tenderness with palpation.  Bladder: Normal, no fullness, no masses, no tenderness with palpation.  Vagina: Vaginal tissues are not inflamed, normal color and texture, no significant discharge present.  Pelvic support adequate.  Cervix: Normal, no lesions, no purulent discharge, no cervical motion tenderness.  Uterus: Normal size, shape, and consistency.  Good mobility noted.  Minimal descent noted with good support.  Adnexa: Normal size and shape bilaterally, no palpable mass bilaterally and non-tender bilaterally.  Rectal: CLAIRE deferred.    Endometrial Biopsy    Date/Time: 6/13/23  Performed by: Alexia Weiss DO  Authorized by: Alexia Weiss DO     Consent:     Consent obtained: verbal and written    Consent given by: patient    Risks discussed: bleeding, infection, need for repeat procedure and pain    " Alternatives discussed: alternative treatment and observation    Patient agrees, verbalizes understanding, and wants to proceed: yes    Indications:     Indications: abnormal uterine bleeding    Procedure:     Prepped with: Betadine    Tenaculum used: yes      Cervix dilated: no    Findings:     Cervix: normal      Specimen collected: specimen collected and sent to pathology      Patient tolerance: tolerated well, no immediate complications  Comments:     Procedure comments: Endometrial biopsy was performed following verbal consent including risks of infection, bleeding, perforation, pain, need for additional procedures. Speculum was placed and cervix was adequately visualized. Cetacaine sprayed on cervix. Betadine was used to cleanse cervix x 3. Tenaculum was placed on anterior cervix. Pipelle was used to sound uterus to 8.5 cm and 1 pass was/were performed with moderate amount of blood tissue collected. The tenaculum was removed. Tenaculum sites were hemostatic and scant blood noted from cervical os. Speculum was removed. Patient tolerated procedure well.     IMPRESSION  Marcia Bradley is a 33 y.o.  presenting with HMB, initially interested in ablation, now desiring Nexplanon for EMB today.    PLAN    1. Abnormal uterine bleeding (AUB)  - Menorrhagia with regular cycle, worse over last year  - Initially did not desire medical management with h/o tubal, worries about weight gain and mood changes previously experienced with birth control; discussed option for LNG-IUD and how this can be helpful   - Discussed recommendation for evaluation: US WNL as above, EMB today for endometrial sampling  - After further discussion of options of medical and surgical management, r/b of ablation, desires to proceed with trial of Nexplanon  - Discussed would recommend trial x 4-6 mos as may take some time for bleeding to regulate; discussed option for POP for BTB if occurs  - To FU for Nexplanon placement        This  document has been electronically signed by Alexia Weiss DO on June 13, 2023 11:23 CDT

## 2023-06-14 LAB — REF LAB TEST METHOD: NORMAL

## 2023-07-27 ENCOUNTER — OFFICE VISIT (OUTPATIENT)
Dept: FAMILY MEDICINE CLINIC | Facility: CLINIC | Age: 33
End: 2023-07-27
Payer: COMMERCIAL

## 2023-07-27 ENCOUNTER — LAB (OUTPATIENT)
Dept: LAB | Facility: HOSPITAL | Age: 33
End: 2023-07-27
Payer: COMMERCIAL

## 2023-07-27 VITALS
WEIGHT: 140 LBS | BODY MASS INDEX: 23.32 KG/M2 | HEART RATE: 79 BPM | DIASTOLIC BLOOD PRESSURE: 80 MMHG | SYSTOLIC BLOOD PRESSURE: 126 MMHG | OXYGEN SATURATION: 100 % | HEIGHT: 65 IN

## 2023-07-27 DIAGNOSIS — K90.49 DAIRY PRODUCT INTOLERANCE: ICD-10-CM

## 2023-07-27 DIAGNOSIS — M26.623 BILATERAL TEMPOROMANDIBULAR JOINT PAIN: Primary | ICD-10-CM

## 2023-07-27 DIAGNOSIS — R10.9 ABDOMINAL PAIN, UNSPECIFIED ABDOMINAL LOCATION: ICD-10-CM

## 2023-07-27 DIAGNOSIS — F41.9 ANXIETY: ICD-10-CM

## 2023-07-27 PROCEDURE — 86008 ALLG SPEC IGE RECOMB EA: CPT

## 2023-07-27 PROCEDURE — 86003 ALLG SPEC IGE CRUDE XTRC EA: CPT

## 2023-07-27 PROCEDURE — 99214 OFFICE O/P EST MOD 30 MIN: CPT | Performed by: NURSE PRACTITIONER

## 2023-07-27 PROCEDURE — 36415 COLL VENOUS BLD VENIPUNCTURE: CPT

## 2023-07-27 RX ORDER — TRAZODONE HYDROCHLORIDE 50 MG/1
50 TABLET ORAL NIGHTLY
Qty: 30 TABLET | Refills: 3 | Status: SHIPPED | OUTPATIENT
Start: 2023-07-27

## 2023-07-27 RX ORDER — IBUPROFEN 800 MG/1
800 TABLET ORAL EVERY 8 HOURS PRN
Qty: 60 TABLET | Refills: 3 | Status: SHIPPED | OUTPATIENT
Start: 2023-07-27

## 2023-07-31 LAB
A-LACTALB IGE QN: <0.1 KU/L
CASEIN IGE QN: <0.1 KU/L
CHEDDAR IGE QN: <0.1 KU/L
CHEESE MOLD IGE QN: <0.1 KU/L
CONV CLASS DESCRIPTION: NORMAL
COW MILK BOILED IGE QN: <0.1 KU/L
COW MILK IGE QN: <0.1 KU/L
EGG WHITE IGE QN: <0.1 KU/L
EGG YOLK IGE QN: <0.1 KU/L
WHOLE EGG IGE QN: <0.1 KU/L

## 2023-08-16 ENCOUNTER — OFFICE VISIT (OUTPATIENT)
Dept: GASTROENTEROLOGY | Facility: CLINIC | Age: 33
End: 2023-08-16
Payer: COMMERCIAL

## 2023-08-16 VITALS
SYSTOLIC BLOOD PRESSURE: 110 MMHG | WEIGHT: 138.2 LBS | DIASTOLIC BLOOD PRESSURE: 79 MMHG | HEART RATE: 60 BPM | BODY MASS INDEX: 23.03 KG/M2 | HEIGHT: 65 IN

## 2023-08-16 DIAGNOSIS — R11.0 NAUSEA: ICD-10-CM

## 2023-08-16 DIAGNOSIS — Z87.19 HISTORY OF COLITIS: ICD-10-CM

## 2023-08-16 DIAGNOSIS — R19.7 DIARRHEA, UNSPECIFIED TYPE: Primary | ICD-10-CM

## 2023-08-16 DIAGNOSIS — D50.8 OTHER IRON DEFICIENCY ANEMIA: ICD-10-CM

## 2023-08-16 PROBLEM — D64.9 ABSOLUTE ANEMIA: Status: ACTIVE | Noted: 2023-08-16

## 2023-08-16 PROCEDURE — 99214 OFFICE O/P EST MOD 30 MIN: CPT | Performed by: PHYSICIAN ASSISTANT

## 2023-08-16 RX ORDER — SODIUM, POTASSIUM,MAG SULFATES 17.5-3.13G
1 SOLUTION, RECONSTITUTED, ORAL ORAL EVERY 12 HOURS
Qty: 354 ML | Refills: 0 | Status: SHIPPED | OUTPATIENT
Start: 2023-08-16

## 2023-08-16 RX ORDER — DICYCLOMINE HYDROCHLORIDE 10 MG/1
10 CAPSULE ORAL 3 TIMES DAILY
Qty: 90 CAPSULE | Refills: 2 | Status: SHIPPED | OUTPATIENT
Start: 2023-08-16

## 2023-08-16 RX ORDER — DEXTROSE AND SODIUM CHLORIDE 5; .45 G/100ML; G/100ML
30 INJECTION, SOLUTION INTRAVENOUS CONTINUOUS PRN
OUTPATIENT
Start: 2023-08-16

## 2023-08-16 NOTE — PROGRESS NOTES
Chief Complaint   Patient presents with    Abdominal Pain    Heartburn    hx colitis       ENDO PROCEDURE ORDERED: EGD/COLON n, gerd, abd pain, diarrhea LOOK TI, bx, duodenal aspirate    Subjective    Marcia Bradley is a 33 y.o. female. she is being seen for consultation today at the request of GURPREET Becker    History of Present Illness    This 33-year-old female works at [email or web address] was sent for history of ulcerative colitis, GERD, abdominal pain by GURPREET Becker, who saw the patient on 02/20/2023 with abdominal pain, bloating worse during her menstrual periods. She is on Nexplanon. She had had egg and dairy panel that was negative on 07/31/2023. Prior laboratory on 01/16/2023 was negative for alpha-gal; celiac was negative. B12 normal. CBC showed anemia with hemoglobin 10.2, hematocrit 32.3. Normal TSH. CMP was normal. Iron 24 with saturation 5%. The patient states she has frequent nausea. Greasy foods bother her a lot. She has trouble when she is stressed. Dairy products bother her a great deal. She will have sulfurous belches with that. She feels a fullness in her abdomen. She states she sometimes has diarrhea, sometimes has small balls of stool that will not drop. She does see mucus in the stool but no blood. She has noticed when she has bouts of diarrhea she often has a migraine. She has nausea and increased heartburn. The patient had prior ER visit with colitis in 07/2020. Had a CT scan showing diffuse inflammatory changes of the colon. She subsequently had a colonoscopy by Dr. Heath on 07/28/2021 that showed a resolving proctosigmoid colitis. He treated her with Augmentin and Reglan. She had ulceration of the cecum, erythema of the sigmoid and rectum. Rectal biopsy was negative. Cecal biopsy showed mild active colitis, slight architectural distortion, infiltrate, focal cryptitis and crypt abscesses. No granulomas. IBD not excluded. She states she was going through a divorce at that time.     The  patient currently denies tobacco, alcohol, illicit substance use. She has a history of  x2, dental surgery, anemia. Family history of unknown cancer, stroke, hypertension. No known family history of inflammatory bowel disease or liver disease. Parents in good health. She lists no spouse. One brother, 2 sisters, 2 children in good health.     ASSESSMENT/PLAN:  Patient with nausea, GERD, abdominal pain, diarrhea with previous history of possibly infectious colitis. Not clear if this was inflammatory bowel disease. Recommend EGD/colonoscopy. We will do small bowel biopsies. We will also attempt to look at the terminal ileum with biopsies. We will try to get a duodenal aspirate for possible small bowel overgrowth. I did recommend checking a CBC, CMP, recurrent GI distress panel, iron panel for her anemia. She is iron deficient on her last laboratories. We will give her a trial on Bentyl 10 mg up to t.i.d. I suggested she try some probiotics. Would consider a trial on Xifaxan. We will see her after the above. Further pending clinical course and the results of the above.    Thank you very much, Nunu, for this consultation and for allowing us to participate in the care of your patient. We will keep you informed.         The following portions of the patient's history were reviewed and updated as appropriate:   Past Medical History:   Diagnosis Date    Absolute anemia 2023    Allergic rhinitis     Anxiety     Depression      Past Surgical History:   Procedure Laterality Date     SECTION       SECTION WITH TUBAL N/A 2017    Procedure:  SECTION REPEAT WITH TUBAL;  Surgeon: Grayson Vitale MD;  Location: [facility] LABOR DELIVERY;  Service:     WISDOM TOOTH EXTRACTION       Family History   Problem Relation Age of Onset    Hypertension Mother     Anxiety disorder Mother     Hypertension Father     Other Sister     Cancer Maternal Grandmother     Stroke Paternal Grandmother      "Heart disease Paternal Grandfather     Diabetes Paternal Grandfather     No Known Problems Half-Brother      OB History          2    Para   2    Term   2            AB        Living   2         SAB        IAB        Ectopic        Molar        Multiple   0    Live Births   2              No Known Allergies  Social History     Socioeconomic History    Marital status:    Tobacco Use    Smoking status: Former    Smokeless tobacco: Never   Vaping Use    Vaping Use: Former    Passive vaping exposure: Yes   Substance and Sexual Activity    Alcohol use: Yes     Comment: about 3 drinks a week, except during pregnancy    Drug use: No    Sexual activity: Yes     Partners: Male     Birth control/protection: None     Current Medications:  Prior to Admission medications    Medication Sig Start Date End Date Taking? Authorizing Provider   Etonogestrel (NEXPLANON) 68 MG implant subdermal implant Inject 1 each into the appropriate area of the skin as directed by provider 1 (One) Time.   Yes Provider, MD Sarah   ibuprofen (ADVIL,MOTRIN) 800 MG tablet Take 1 tablet by mouth Every 8 (Eight) Hours As Needed for Moderate Pain. 23  Yes Nunu López APRN   omeprazole (priLOSEC) 20 MG capsule Take 1 capsule by mouth Daily. 23  Yes Nunu López APRN   traZODone (DESYREL) 50 MG tablet Take 1 tablet by mouth Every Night.  Patient not taking: Reported on 2023   Nunu López APRN     Review of Systems  Review of Systems   Constitutional:  Negative for unexpected weight change.   HENT:  Negative for trouble swallowing.    Gastrointestinal:  Positive for abdominal pain, diarrhea and nausea.        Objective    /79   Pulse 60   Ht 165.1 cm (65\")   Wt 62.7 kg (138 lb 3.2 oz)   BMI 23.00 kg/mý   Physical Exam  Vitals and nursing note reviewed.   Constitutional:       General: She is not in acute distress.     Appearance: She is well-developed. She is ill-appearing.   HENT:      " Head: Normocephalic and atraumatic.   Eyes:      Pupils: Pupils are equal, round, and reactive to light.   Cardiovascular:      Rate and Rhythm: Normal rate and regular rhythm.      Heart sounds: Normal heart sounds.   Pulmonary:      Effort: Pulmonary effort is normal.      Breath sounds: Normal breath sounds.   Abdominal:      General: Bowel sounds are normal. There is no distension or abdominal bruit.      Palpations: Abdomen is soft. Abdomen is not rigid. There is no shifting dullness or mass.      Tenderness: There is abdominal tenderness. There is no guarding or rebound.      Hernia: No hernia is present. There is no hernia in the ventral area.   Musculoskeletal:         General: Normal range of motion.      Cervical back: Normal range of motion.   Skin:     General: Skin is warm and dry.   Neurological:      Mental Status: She is alert and oriented to person, place, and time.   Psychiatric:         Behavior: Behavior normal.         Thought Content: Thought content normal.         Judgment: Judgment normal.     Assessment & Plan      1. Diarrhea, unspecified type    2. History of colitis    3. Other iron deficiency anemia    4. Nausea    .   Diagnoses and all orders for this visit:    1. Diarrhea, unspecified type (Primary)  -     CBC & Differential  -     Comprehensive Metabolic Panel  -     Iron Profile  -     Ferritin  -     Recurrent Gastrointestinal Distress  -     Case Request; Standing  -     dextrose 5 % and sodium chloride 0.45 % infusion  -     Case Request    2. History of colitis  -     CBC & Differential  -     Comprehensive Metabolic Panel  -     Iron Profile  -     Ferritin  -     Recurrent Gastrointestinal Distress  -     Case Request; Standing  -     dextrose 5 % and sodium chloride 0.45 % infusion  -     Case Request    3. Other iron deficiency anemia  -     CBC & Differential  -     Comprehensive Metabolic Panel  -     Iron Profile  -     Ferritin  -     Recurrent Gastrointestinal  Distress  -     Case Request; Standing  -     dextrose 5 % and sodium chloride 0.45 % infusion  -     Case Request    4. Nausea  -     CBC & Differential  -     Comprehensive Metabolic Panel  -     Iron Profile  -     Ferritin  -     Recurrent Gastrointestinal Distress  -     Case Request; Standing  -     dextrose 5 % and sodium chloride 0.45 % infusion  -     Case Request    Other orders  -     dicyclomine (BENTYL) 10 MG capsule; Take 1 capsule by mouth 3 (Three) Times a Day.  Dispense: 90 capsule; Refill: 2  -     Obtain Informed Consent; Standing  -     Follow Anesthesia Guidelines / Protocol; Future  -     Obtain Informed Consent; Future  -     POC Glucose Once; Standing  -     sodium-potassium-magnesium sulfates (Suprep Bowel Prep Kit) 17.5-3.13-1.6 GM/177ML solution oral solution; Take 1 bottle by mouth Every 12 (Twelve) Hours.  Dispense: 354 mL; Refill: 0        Orders placed during this encounter include:  Orders Placed This Encounter   Procedures    Comprehensive Metabolic Panel     Order Specific Question:   Release to patient     Answer:   Routine Release [[id number]]    Iron Profile     Order Specific Question:   Release to patient     Answer:   Routine Release [[id number]]    Ferritin     Order Specific Question:   Release to patient     Answer:   Routine Release [[id number]]    Recurrent Gastrointestinal Distress     Order Specific Question:   Release to patient     Answer:   Routine Release [[id number]]    Obtain Informed Consent     Standing Status:   Future     Order Specific Question:   Informed Consent Given For     Answer:   ESOPHAGOGASTRODUODENOSCOPY and colonoscopy    CBC & Differential     Order Specific Question:   Manual Differential     Answer:   No     Order Specific Question:   Release to patient     Answer:   Routine Release [[id number]]       Medications prescribed:  New Medications Ordered This Visit   Medications    dicyclomine (BENTYL) 10 MG capsule     Sig: Take 1 capsule by mouth 3  (Three) Times a Day.     Dispense:  90 capsule     Refill:  2    sodium-potassium-magnesium sulfates (Suprep Bowel Prep Kit) 17.5-3.13-1.6 GM/177ML solution oral solution     Sig: Take 1 bottle by mouth Every 12 (Twelve) Hours.     Dispense:  354 mL     Refill:  0       Requested Prescriptions     Signed Prescriptions Disp Refills    dicyclomine (BENTYL) 10 MG capsule 90 capsule 2     Sig: Take 1 capsule by mouth 3 (Three) Times a Day.    sodium-potassium-magnesium sulfates (Suprep Bowel Prep Kit) 17.5-3.13-1.6 GM/177ML solution oral solution 354 mL 0     Sig: Take 1 bottle by mouth Every 12 (Twelve) Hours.       Review and/or summary of lab tests, radiology, procedures, medications. Review and summary of old records and obtaining of history. The risks and benefits of my recommendations, as well as other treatment options were discussed with the patient today. Questions were answered.    Follow-up: Return in about 1 month (around 9/16/2023), or if symptoms worsen or fail to improve, for lab now.     ESOPHAGOGASTRODUODENOSCOPY--bx, duodenal aspirate, look TI (N/A), COLONOSCOPY (N/A)      This document has been electronically signed by Manuel Cunningham PA-C on August 29, 2023 19:04 CDT      Results for orders placed or performed in visit on 07/27/23   Allergens (9) Dairy / Egg    Specimen: Blood   Result Value Ref Range    Class Description Comment     Egg White <0.10 Class 0 kU/L    Milk, Cow's <0.10 Class 0 kU/L    Egg Yolk <0.10 Class 0 kU/L    Alpha Lactalbumin <0.10 Class 0 kU/L    Casein <0.10 Class 0 kU/L    Cheddar Cheese <0.10 Class 0 kU/L    Mold Cheese <0.10 Class 0 kU/L    Milk, Boiled <0.10 Class 0 kU/L    Egg <0.10 Class 0 kU/L   Results for orders placed or performed in visit on 06/13/23   TISSUE EXAM, [facility] ([facility])    Specimen: Tissue   Result Value Ref Range    Reference Lab Report       [facility]  [address] [place] [address]  Phone: [phone] or  "[phone]  Fax: [phone]  Vamshi Hua M.D., Medical Director    PATIENT NAME                                     LABORATORY NO.  [id number] REES                               [id number]  [id number]                                 AGE                    SEX   SSN              CLIENT REF #  [facility]                33        1990      F     [id number]      [id number]    [facility]                               REQUESTING M.D.           ATTENDING M.D.         COPY TO[address] ISADORA JENKINS             [facility], 2ND FLOOR                  DATE COLLECTED            DATE RECEIVED          [place] [address]                     2023    DIAGNOSIS:  ENDOMETRIUM, BIOPSY:  Mid to late secretory pattern  endometrium  Negative for atypia or malignancy    JENNIFER    CLINICAL HISTORY:  Amenorrhea    SPECIMENS RECEIVED:  ENDOMETRIUM, BIOPSY    MICROSCOPIC DESCRIPTION:  Tissue blocks are prepared and slides are examined microscopically on all  specimens. See diagnosis for details.    Professional interpretation rendered by Denis Roy M.D., F.C.A.P. at [facility], [address] [place] [address].    GROSS DESCRIPTION:  Labeled per requisition \"endometrial biopsy\" consists of multiple pieces of tan  soft tissue measuring 3.0 x 2.0 x 0.6 cm in aggregate.  Specimen is filtered and  submitted entirely in 2 blocks.  [facility]    REVIEWED, DIAGNOSED AND ELECTRONICALLY  SIGNED BY:    Denis Roy M.D., F.C.A.P.  CPT CODES:  18065     POC Pregnancy, Urine    Specimen: Urine   Result Value Ref Range    HCG, Urine, QL Negative Negative    Lot Number [id number]     Internal Positive Control Passed Positive, Passed    Internal Negative Control Passed Negative, Passed    Expiration Date 2024    Results for orders placed or performed in visit on 23 "   FSH & LH    Specimen: Blood   Result Value Ref Range    FSH 11.10 mIU/mL    LH 9.29 mIU/mL   Insulin, Total    Specimen: Blood   Result Value Ref Range    Insulin 10.5 2.6 - 24.9 uIU/mL   Estradiol    Specimen: Blood   Result Value Ref Range    Estradiol 53.4 pg/mL   Results for orders placed or performed in visit on 02/14/23   Testosterone, Free, Total    Specimen: Blood   Result Value Ref Range    Testosterone, Total 13 8 - 60 ng/dL    Testosterone, Free 0.5 0.0 - 4.2 pg/mL   Results for orders placed or performed in visit on 01/16/23   Alpha-Gal IgE Panel    Specimen: Blood   Result Value Ref Range    Class Description Comment     IgE 16 6 - 495 IU/mL    [id number] Alpha-Gal <0.10 Class 0 kU/L    Beef <0.10 Class 0 kU/L    Pork <0.10 Class 0 kU/L    Lamb <0.10 Class 0 kU/L   Celiac Disease Panel    Specimen: Blood   Result Value Ref Range    Endomysial IgA Negative Negative    Tissue Transglutaminase IgA <2 0 - 3 U/mL    IgA 128 87 - 352 mg/dL   CBC Auto Differential    Specimen: Blood   Result Value Ref Range    WBC 5.42 3.40 - 10.80 10*3/mm3    RBC 4.12 3.77 - 5.28 10*6/mm3    Hemoglobin 10.2 (L) 12.0 - 15.9 g/dL    Hematocrit 32.3 (L) 34.0 - 46.6 %    MCV 78.4 (L) 79.0 - 97.0 fL    MCH 24.8 (L) 26.6 - 33.0 pg    MCHC 31.6 31.5 - 35.7 g/dL    RDW 16.5 (H) 12.3 - 15.4 %    RDW-SD 44.2 37.0 - 54.0 fl    MPV 12.2 (H) 6.0 - 12.0 fL    Platelets 267 140 - 450 10*3/mm3    Neutrophil % 69.7 42.7 - 76.0 %    Lymphocyte % 19.4 (L) 19.6 - 45.3 %    Monocyte % 7.4 5.0 - 12.0 %    Eosinophil % 2.2 0.3 - 6.2 %    Basophil % 0.9 0.0 - 1.5 %    Immature Grans % 0.4 0.0 - 0.5 %    Neutrophils, Absolute 3.78 1.70 - 7.00 10*3/mm3    Lymphocytes, Absolute 1.05 0.70 - 3.10 10*3/mm3    Monocytes, Absolute 0.40 0.10 - 0.90 10*3/mm3    Eosinophils, Absolute 0.12 0.00 - 0.40 10*3/mm3    Basophils, Absolute 0.05 0.00 - 0.20 10*3/mm3    Immature Grans, Absolute 0.02 0.00 - 0.05 10*3/mm3    nRBC 0.0 0.0 - 0.2 /100 WBC   Iron Profile     Specimen: Blood   Result Value Ref Range    Iron 24 (L) 37 - 145 mcg/dL    Iron Saturation (TSAT) 5 (L) 20 - 50 %    Transferrin 315 200 - 360 mg/dL    TIBC 469 298 - 536 mcg/dL   TSH    Specimen: Blood   Result Value Ref Range    TSH 0.977 0.270 - 4.200 uIU/mL   Vitamin B12    Specimen: Blood   Result Value Ref Range    Vitamin B-12 618 211 - 946 pg/mL   Comprehensive Metabolic Panel    Specimen: Blood   Result Value Ref Range    Glucose 84 65 - 99 mg/dL    BUN 13 6 - 20 mg/dL    Creatinine 0.70 0.57 - 1.00 mg/dL    Sodium 141 136 - 145 mmol/L    Potassium 4.0 3.5 - 5.2 mmol/L    Chloride 107 98 - 107 mmol/L    CO2 26.0 22.0 - 29.0 mmol/L    Calcium 9.0 8.6 - 10.5 mg/dL    Total Protein 6.9 6.0 - 8.5 g/dL    Albumin 4.8 3.5 - 5.2 g/dL    ALT (SGPT) 13 1 - 33 U/L    AST (SGOT) 20 1 - 32 U/L    Alkaline Phosphatase 48 39 - 117 U/L    Total Bilirubin 0.5 0.0 - 1.2 mg/dL    Globulin 2.1 gm/dL    A/G Ratio 2.3 g/dL    BUN/Creatinine Ratio 18.6 7.0 - 25.0    Anion Gap 8.0 5.0 - 15.0 mmol/L    eGFR 117.3 >60.0 mL/min/1.73   Results for orders placed or performed in visit on 12/14/22   CBC Auto Differential    Specimen: Blood   Result Value Ref Range    WBC 5.36 3.40 - 10.80 10*3/mm3    RBC 4.07 3.77 - 5.28 10*6/mm3    Hemoglobin 9.9 (L) 12.0 - 15.9 g/dL    Hematocrit 32.0 (L) 34.0 - 46.6 %    MCV 78.6 (L) 79.0 - 97.0 fL    MCH 24.3 (L) 26.6 - 33.0 pg    MCHC 30.9 (L) 31.5 - 35.7 g/dL    RDW 13.8 12.3 - 15.4 %    RDW-SD 38.9 37.0 - 54.0 fl    MPV 12.2 (H) 6.0 - 12.0 fL    Platelets 253 140 - 450 10*3/mm3    Neutrophil % 62.4 42.7 - 76.0 %    Lymphocyte % 27.6 19.6 - 45.3 %    Monocyte % 6.9 5.0 - 12.0 %    Eosinophil % 2.2 0.3 - 6.2 %    Basophil % 0.7 0.0 - 1.5 %    Immature Grans % 0.2 0.0 - 0.5 %    Neutrophils, Absolute 3.34 1.70 - 7.00 10*3/mm3    Lymphocytes, Absolute 1.48 0.70 - 3.10 10*3/mm3    Monocytes, Absolute 0.37 0.10 - 0.90 10*3/mm3    Eosinophils, Absolute 0.12 0.00 - 0.40 10*3/mm3    Basophils, Absolute  0.04 0.00 - 0.20 10*3/mm3    Immature Grans, Absolute 0.01 0.00 - 0.05 10*3/mm3    nRBC 0.0 0.0 - 0.2 /100 WBC     *Note: Due to a large number of results and/or encounters for the requested time period, some results have not been displayed. A complete set of results can be found in Results Review.

## 2023-08-30 ENCOUNTER — LAB (OUTPATIENT)
Dept: LAB | Facility: HOSPITAL | Age: 33
End: 2023-08-30
Payer: COMMERCIAL

## 2023-08-30 PROCEDURE — 86003 ALLG SPEC IGE CRUDE XTRC EA: CPT | Performed by: PHYSICIAN ASSISTANT

## 2023-08-30 PROCEDURE — 86258 DGP ANTIBODY EACH IG CLASS: CPT | Performed by: PHYSICIAN ASSISTANT

## 2023-08-30 PROCEDURE — 36415 COLL VENOUS BLD VENIPUNCTURE: CPT | Performed by: PHYSICIAN ASSISTANT

## 2023-08-30 PROCEDURE — 82728 ASSAY OF FERRITIN: CPT | Performed by: PHYSICIAN ASSISTANT

## 2023-08-30 PROCEDURE — 84466 ASSAY OF TRANSFERRIN: CPT | Performed by: PHYSICIAN ASSISTANT

## 2023-08-30 PROCEDURE — 86431 RHEUMATOID FACTOR QUANT: CPT | Performed by: PHYSICIAN ASSISTANT

## 2023-08-30 PROCEDURE — 83540 ASSAY OF IRON: CPT | Performed by: PHYSICIAN ASSISTANT

## 2023-08-30 PROCEDURE — 85025 COMPLETE CBC W/AUTO DIFF WBC: CPT | Performed by: PHYSICIAN ASSISTANT

## 2023-08-30 PROCEDURE — 80053 COMPREHEN METABOLIC PANEL: CPT | Performed by: PHYSICIAN ASSISTANT

## 2023-08-30 PROCEDURE — 86364 TISS TRNSGLTMNASE EA IG CLAS: CPT | Performed by: PHYSICIAN ASSISTANT

## 2023-08-31 DIAGNOSIS — M25.50 ARTHRALGIA, UNSPECIFIED JOINT: Primary | ICD-10-CM

## 2023-08-31 LAB
ALBUMIN SERPL-MCNC: 4.7 G/DL (ref 3.5–5.2)
ALBUMIN/GLOB SERPL: 1.7 G/DL
ALP SERPL-CCNC: 40 U/L (ref 39–117)
ALT SERPL W P-5'-P-CCNC: 12 U/L (ref 1–33)
ANION GAP SERPL CALCULATED.3IONS-SCNC: 11.7 MMOL/L (ref 5–15)
AST SERPL-CCNC: 19 U/L (ref 1–32)
BASOPHILS # BLD AUTO: 0.05 10*3/MM3 (ref 0–0.2)
BASOPHILS NFR BLD AUTO: 1 % (ref 0–1.5)
BILIRUB SERPL-MCNC: 0.7 MG/DL (ref 0–1.2)
BUN SERPL-MCNC: 14 MG/DL (ref 6–20)
BUN/CREAT SERPL: 19.4 (ref 7–25)
CALCIUM SPEC-SCNC: 9 MG/DL (ref 8.6–10.5)
CHLORIDE SERPL-SCNC: 104 MMOL/L (ref 98–107)
CO2 SERPL-SCNC: 23.3 MMOL/L (ref 22–29)
CREAT SERPL-MCNC: 0.72 MG/DL (ref 0.57–1)
DEPRECATED RDW RBC AUTO: 43.2 FL (ref 37–54)
EGFRCR SERPLBLD CKD-EPI 2021: 112.7 ML/MIN/1.73
EOSINOPHIL # BLD AUTO: 0.1 10*3/MM3 (ref 0–0.4)
EOSINOPHIL NFR BLD AUTO: 2.1 % (ref 0.3–6.2)
ERYTHROCYTE [DISTWIDTH] IN BLOOD BY AUTOMATED COUNT: 15 % (ref 12.3–15.4)
FERRITIN SERPL-MCNC: 12.1 NG/ML (ref 13–150)
GLIADIN PEPTIDE IGA SER-ACNC: 5 UNITS (ref 0–19)
GLIADIN PEPTIDE IGG SER-ACNC: 2 UNITS (ref 0–19)
GLOBULIN UR ELPH-MCNC: 2.8 GM/DL
GLUCOSE SERPL-MCNC: 82 MG/DL (ref 65–99)
HCT VFR BLD AUTO: 35.1 % (ref 34–46.6)
HGB BLD-MCNC: 11.2 G/DL (ref 12–15.9)
IMM GRANULOCYTES # BLD AUTO: 0.01 10*3/MM3 (ref 0–0.05)
IMM GRANULOCYTES NFR BLD AUTO: 0.2 % (ref 0–0.5)
IRON 24H UR-MRATE: 42 MCG/DL (ref 37–145)
IRON SATN MFR SERPL: 9 % (ref 20–50)
LYMPHOCYTES # BLD AUTO: 1.6 10*3/MM3 (ref 0.7–3.1)
LYMPHOCYTES NFR BLD AUTO: 32.9 % (ref 19.6–45.3)
MCH RBC QN AUTO: 25.2 PG (ref 26.6–33)
MCHC RBC AUTO-ENTMCNC: 31.9 G/DL (ref 31.5–35.7)
MCV RBC AUTO: 79.1 FL (ref 79–97)
MONOCYTES # BLD AUTO: 0.38 10*3/MM3 (ref 0.1–0.9)
MONOCYTES NFR BLD AUTO: 7.8 % (ref 5–12)
NEUTROPHILS NFR BLD AUTO: 2.72 10*3/MM3 (ref 1.7–7)
NEUTROPHILS NFR BLD AUTO: 56 % (ref 42.7–76)
NRBC BLD AUTO-RTO: 0 /100 WBC (ref 0–0.2)
PLATELET # BLD AUTO: 240 10*3/MM3 (ref 140–450)
PMV BLD AUTO: 12.3 FL (ref 6–12)
POTASSIUM SERPL-SCNC: 3.9 MMOL/L (ref 3.5–5.2)
PROT SERPL-MCNC: 7.5 G/DL (ref 6–8.5)
RBC # BLD AUTO: 4.44 10*6/MM3 (ref 3.77–5.28)
SODIUM SERPL-SCNC: 139 MMOL/L (ref 136–145)
TIBC SERPL-MCNC: 474 MCG/DL (ref 298–536)
TRANSFERRIN SERPL-MCNC: 318 MG/DL (ref 200–360)
TTG IGA SER-ACNC: <2 U/ML (ref 0–3)
TTG IGG SER-ACNC: 3 U/ML (ref 0–5)
WBC NRBC COR # BLD: 4.86 10*3/MM3 (ref 3.4–10.8)

## 2023-09-01 LAB — CHROMATIN AB SERPL-ACNC: <10 IU/ML (ref 0–14)

## 2023-09-03 LAB
CODFISH IGE QN: <0.1 KU/L
CONV CLASS DESCRIPTION: NORMAL
COW MILK IGE QN: <0.1 KU/L
EGG WHITE IGE QN: <0.1 KU/L
GLUTEN IGE QN: <0.1 KU/L
HAZELNUT IGE QN: <0.1 KU/L
PEANUT IGE QN: <0.1 KU/L
SCALLOP IGE QN: <0.1 KU/L
SESAME SEED IGE QN: <0.1 KU/L
SHRIMP IGE QN: <0.1 KU/L
SOYBEAN IGE QN: <0.1 KU/L
WALNUT IGE QN: <0.1 KU/L
WHEAT IGE QN: <0.1 KU/L

## 2023-09-07 ENCOUNTER — OFFICE VISIT (OUTPATIENT)
Dept: FAMILY MEDICINE CLINIC | Facility: CLINIC | Age: 33
End: 2023-09-07
Payer: COMMERCIAL

## 2023-09-07 VITALS
OXYGEN SATURATION: 100 % | HEIGHT: 65 IN | SYSTOLIC BLOOD PRESSURE: 124 MMHG | WEIGHT: 137 LBS | DIASTOLIC BLOOD PRESSURE: 78 MMHG | HEART RATE: 74 BPM | BODY MASS INDEX: 22.82 KG/M2

## 2023-09-07 DIAGNOSIS — F41.9 ANXIETY: ICD-10-CM

## 2023-09-07 DIAGNOSIS — Z00.00 ANNUAL PHYSICAL EXAM: Primary | ICD-10-CM

## 2023-09-07 DIAGNOSIS — G47.00 INSOMNIA, UNSPECIFIED TYPE: ICD-10-CM

## 2023-09-07 DIAGNOSIS — D50.9 IRON DEFICIENCY ANEMIA, UNSPECIFIED IRON DEFICIENCY ANEMIA TYPE: ICD-10-CM

## 2023-09-07 PROCEDURE — 99214 OFFICE O/P EST MOD 30 MIN: CPT | Performed by: NURSE PRACTITIONER

## 2023-09-07 PROCEDURE — 99395 PREV VISIT EST AGE 18-39: CPT | Performed by: NURSE PRACTITIONER

## 2023-09-07 RX ORDER — HYDROXYZINE HYDROCHLORIDE 25 MG/1
25 TABLET, FILM COATED ORAL NIGHTLY
Qty: 30 TABLET | Refills: 6 | Status: SHIPPED | OUTPATIENT
Start: 2023-09-07

## 2023-09-07 RX ORDER — PROPRANOLOL HYDROCHLORIDE 10 MG/1
10 TABLET ORAL 2 TIMES DAILY
Qty: 60 TABLET | Refills: 6 | Status: SHIPPED | OUTPATIENT
Start: 2023-09-07

## 2023-09-07 RX ORDER — ASCORBIC ACID 500 MG
1 TABLET ORAL DAILY
COMMUNITY
Start: 2023-09-01

## 2023-09-07 NOTE — PROGRESS NOTES
"Chief Complaint  Annual Exam    Subjective          Marcia Bradley presents to [facility] [place]  for annual exam as well as to discuss her anxiety/mental health  Anxiety  Presents for follow-up visit. Onset was at an unknown time. The problem has been waxing and waning. Symptoms include insomnia, muscle tension and nervous/anxious behavior. Symptoms occur most days. The severity of symptoms is causing significant distress. Nothing aggravates the symptoms.     Past treatments include lifestyle changes. The treatment provided no relief.   Outpatient Medications Prior to Visit   Medication Sig Dispense Refill    dicyclomine (BENTYL) 10 MG capsule Take 1 capsule by mouth 3 (Three) Times a Day. 90 capsule 2    Etonogestrel (NEXPLANON) 68 MG implant subdermal implant Inject 1 each into the appropriate area of the skin as directed by provider 1 (One) Time.      ibuprofen (ADVIL,MOTRIN) 800 MG tablet Take 1 tablet by mouth Every 8 (Eight) Hours As Needed for Moderate Pain. 60 tablet 3    omeprazole (priLOSEC) 20 MG capsule Take 1 capsule by mouth Daily. 34 capsule 11    vitamin C (ASCORBIC ACID) 500 MG tablet Take 1 tablet by mouth Daily.      sodium-potassium-magnesium sulfates (Suprep Bowel Prep Kit) 17.5-3.13-1.6 GM/177ML solution oral solution Take 1 bottle by mouth Every 12 (Twelve) Hours. (Patient not taking: Reported on 9/7/2023) 354 mL 0    traZODone (DESYREL) 50 MG tablet Take 1 tablet by mouth Every Night. (Patient not taking: Reported on 8/16/2023) 30 tablet 3     No facility-administered medications prior to visit.       Review of Systems   Psychiatric/Behavioral:  The patient is nervous/anxious and has insomnia.        Objective   Vital Signs:   Visit Vitals  /78 (BP Location: Left arm, Patient Position: Sitting, Cuff Size: Adult)   Pulse 74   Ht 165.1 cm (65\")   Wt 62.1 kg (137 lb)   SpO2 100%   BMI 22.80 kg/m²     Physical Exam  Vitals and nursing note " reviewed.   Constitutional:       Appearance: She is well-developed.   HENT:      Head: Normocephalic and atraumatic.   Eyes:      General: Lids are normal.      Conjunctiva/sclera: Conjunctivae normal.   Neck:      Thyroid: No thyroid mass or thyromegaly.      Trachea: Trachea normal. No tracheal tenderness.   Cardiovascular:      Rate and Rhythm: Normal rate.      Pulses: Normal pulses.      Heart sounds: Normal heart sounds.   Pulmonary:      Effort: Pulmonary effort is normal. No respiratory distress.      Breath sounds: Normal breath sounds. No wheezing.   Abdominal:      General: There is no distension.      Palpations: Abdomen is soft. There is no mass.   Musculoskeletal:         General: Normal range of motion.      Cervical back: Normal range of motion. No edema.   Skin:     General: Skin is warm and dry.      Coloration: Skin is not pale.      Findings: No abrasion, erythema or lesion.   Neurological:      Mental Status: She is alert and oriented to person, place, and time.   Psychiatric:         Mood and Affect: Mood is anxious. Affect is not inappropriate.         Speech: Speech normal.         Behavior: Behavior normal.         Thought Content: Thought content normal.         Judgment: Judgment normal. Judgment is not impulsive.      Result Review :     Common labs          12/14/2022    10:12 1/16/2023    10:36 8/30/2023    12:16   Common Labs   Glucose 97  84  82    BUN 9  13  14    Creatinine 0.71  0.70  0.72    Sodium 140  141  139    Potassium 4.1  4.0  3.9    Chloride 105  107  104    Calcium 9.1  9.0  9.0    Albumin 5.00  4.8  4.7    Total Bilirubin 0.5  0.5  0.7    Alkaline Phosphatase 46  48  40    AST (SGOT) 20  20  19    ALT (SGPT) 12  13  12    WBC 5.36  5.42  4.86    Hemoglobin 9.9  10.2  11.2    Hematocrit 32.0  32.3  35.1    Platelets 253  267  240    Total Cholesterol 139      Triglycerides 59      HDL Cholesterol 65      LDL Cholesterol  62      Hemoglobin A1C 5.30                   Assessment and Plan    Diagnoses and all orders for this visit:    1. Annual physical exam (Primary)  -     TSH; Future  -     Comprehensive Metabolic Panel; Future  -     Hemoglobin A1c; Future  -     CBC & Differential; Future  -     Lipid Panel; Future    2. Anxiety  -     Ambulatory Referral to Behavioral Health  -     propranolol (INDERAL) 10 MG tablet; Take 1 tablet by mouth 2 (Two) Times a Day.  Dispense: 60 tablet; Refill: 6  -     hydrOXYzine (ATARAX) 25 MG tablet; Take 1 tablet by mouth Every Night.  Dispense: 30 tablet; Refill: 6  -     TSH; Future  -     Comprehensive Metabolic Panel; Future  -     Hemoglobin A1c; Future  -     CBC & Differential; Future  -     Lipid Panel; Future    3. Iron deficiency anemia, unspecified iron deficiency anemia type  -     Ambulatory Referral to Hematology  -     TSH; Future  -     Comprehensive Metabolic Panel; Future  -     Hemoglobin A1c; Future  -     CBC & Differential; Future  -     Lipid Panel; Future    4. Insomnia, unspecified type  -     hydrOXYzine (ATARAX) 25 MG tablet; Take 1 tablet by mouth Every Night.  Dispense: 30 tablet; Refill: 6  -     TSH; Future  -     Comprehensive Metabolic Panel; Future  -     Hemoglobin A1c; Future  -     CBC & Differential; Future  -     Lipid Panel; Future      Unable to tolerate PO iron pills     Complete ordered lab work   We will call with results  Pending lab results we will discuss further treatment plan and monitoring     Start newly prescribed medications   Please call the office if you have issues         She exercises regularly: yes.  Healthy Diet:yes.    She wears her seat belt:yes.  She has concerns about domestic violence: no.    She is sexually active.    In the past 12 months there has not been new sexual partners.    Condoms are not typically used.    She would not like to be screened for STD's at today's exam.   She is using nex for contraception.    LMP: 8/2023  Periods Regular: no.    OB History           2    Para   2    Term   2            AB        Living   2         SAB        IAB        Ectopic        Molar        Multiple   0    Live Births   2                  She is taking Vit D and Calcium:not asked    Last PAP: 2021  Last Mammo: NA    Immunization status: up to date and documented.        The following portions of the patient's history were reviewed and updated as appropriate:problem list, current medications, allergies, past family history, past medical history, past social history, and past surgical history.      Follow Up   Return if symptoms worsen or fail to improve.  Patient was given instructions and counseling regarding her condition or for health maintenance advice. Please see specific information pulled into the AVS if appropriate.           This document has been electronically signed by GURPREET Becker on 2023 13:00 CDT

## 2023-09-18 ENCOUNTER — CONSULT (OUTPATIENT)
Dept: ONCOLOGY | Facility: CLINIC | Age: 33
End: 2023-09-18
Payer: COMMERCIAL

## 2023-09-18 VITALS
SYSTOLIC BLOOD PRESSURE: 132 MMHG | RESPIRATION RATE: 18 BRPM | WEIGHT: 141 LBS | DIASTOLIC BLOOD PRESSURE: 65 MMHG | BODY MASS INDEX: 23.46 KG/M2 | HEART RATE: 61 BPM | OXYGEN SATURATION: 98 %

## 2023-09-18 DIAGNOSIS — K58.2 IRRITABLE BOWEL SYNDROME WITH BOTH CONSTIPATION AND DIARRHEA: ICD-10-CM

## 2023-09-18 DIAGNOSIS — D50.8 OTHER IRON DEFICIENCY ANEMIA: Primary | ICD-10-CM

## 2023-09-18 DIAGNOSIS — R23.3 EASY BRUISING: ICD-10-CM

## 2023-09-18 PROCEDURE — G0463 HOSPITAL OUTPT CLINIC VISIT: HCPCS | Performed by: INTERNAL MEDICINE

## 2023-09-18 PROCEDURE — 99203 OFFICE O/P NEW LOW 30 MIN: CPT | Performed by: INTERNAL MEDICINE

## 2023-09-18 RX ORDER — POLYETHYLENE GLYCOL 3350 17 G/17G
17 POWDER, FOR SOLUTION ORAL DAILY PRN
Qty: 20 EACH | Refills: 3 | Status: SHIPPED | OUTPATIENT
Start: 2023-09-18 | End: 2023-12-17

## 2023-09-18 NOTE — PROGRESS NOTES
"Chief Complaint  Iron deficiency anemia      Subjective        Marcia Bradley presents to [facility]  History of Present Illness    This is a pleasant 33-year-old female who was seen in consultation at the request Nunu López APRN for evaluation of iron deficiency anemia.  She tells me that she was diagnosed with iron deficiency anemia 2 years ago.  This was contributed due to heavy menses.  She denies any other abnormal bleeding.  She was recommended oral iron however she discontinued this due to constipation.  She does report history of irritable bowel with alternating constipation and diarrhea.  She has appointment with GI for colonoscopy.She had a CBC and iron studies performed on 1230, 2023 which show iron deficiency anemia as indicated by hemoglobin of 11.2, iron saturation of 9 and ferritin of 12.10.  Has any prior history of IV iron.  I been asked to assist with evaluation and management of her iron deficiency anemia.        Objective   Vital Signs:  /65   Pulse 61   Resp 18   Wt 64 kg (141 lb)   SpO2 98%   BMI 23.46 kg/m²   Estimated body mass index is 23.46 kg/m² as calculated from the following:    Height as of 9/7/23: 165.1 cm (65\").    Weight as of this encounter: 64 kg (141 lb).       BMI is within normal parameters. No other follow-up for BMI required.      Physical Exam  Vitals and nursing note reviewed.   Constitutional:       Appearance: Normal appearance.   Neurological:      General: No focal deficit present.      Mental Status: She is alert and oriented to person, place, and time. Mental status is at baseline.   Psychiatric:         Mood and Affect: Mood normal.         Behavior: Behavior normal.         Thought Content: Thought content normal.      Result Review :  The following data was reviewed by: Raz Adkins MD on 09/18/2023:  Common labs          12/14/2022    10:12 1/16/2023    10:36 8/30/2023    12:16   Common " Labs   Glucose 97  84  82    BUN 9  13  14    Creatinine 0.71  0.70  0.72    Sodium 140  141  139    Potassium 4.1  4.0  3.9    Chloride 105  107  104    Calcium 9.1  9.0  9.0    Albumin 5.00  4.8  4.7    Total Bilirubin 0.5  0.5  0.7    Alkaline Phosphatase 46  48  40    AST (SGOT) 20  20  19    ALT (SGPT) 12  13  12    WBC 5.36  5.42  4.86    Hemoglobin 9.9  10.2  11.2    Hematocrit 32.0  32.3  35.1    Platelets 253  267  240    Total Cholesterol 139      Triglycerides 59      HDL Cholesterol 65      LDL Cholesterol  62      Hemoglobin A1C 5.30        CMP          12/14/2022    10:12 1/16/2023    10:36 8/30/2023    12:16   CMP   Glucose 97  84  82    BUN 9  13  14    Creatinine 0.71  0.70  0.72    EGFR 115.3  117.3  112.7    Sodium 140  141  139    Potassium 4.1  4.0  3.9    Chloride 105  107  104    Calcium 9.1  9.0  9.0    Total Protein 7.0  6.9  7.5    Albumin 5.00  4.8  4.7    Globulin 2.0  2.1  2.8    Total Bilirubin 0.5  0.5  0.7    Alkaline Phosphatase 46  48  40    AST (SGOT) 20  20  19    ALT (SGPT) 12  13  12    Albumin/Globulin Ratio 2.5  2.3  1.7    BUN/Creatinine Ratio 12.7  18.6  19.4    Anion Gap 8.9  8.0  11.7      CBC          12/14/2022    10:12 1/16/2023    10:36 8/30/2023    12:16   CBC   WBC 5.36  5.42  4.86    RBC 4.07  4.12  4.44    Hemoglobin 9.9  10.2  11.2    Hematocrit 32.0  32.3  35.1    MCV 78.6  78.4  79.1    MCH 24.3  24.8  25.2    MCHC 30.9  31.6  31.9    RDW 13.8  16.5  15.0    Platelets 253  267  240      CBC w/diff          12/14/2022    10:12 1/16/2023    10:36 8/30/2023    12:16   CBC w/Diff   WBC 5.36  5.42  4.86    RBC 4.07  4.12  4.44    Hemoglobin 9.9  10.2  11.2    Hematocrit 32.0  32.3  35.1    MCV 78.6  78.4  79.1    MCH 24.3  24.8  25.2    MCHC 30.9  31.6  31.9    RDW 13.8  16.5  15.0    Platelets 253  267  240    Neutrophil Rel % 62.4  69.7  56.0    Immature Granulocyte Rel % 0.2  0.4  0.2    Lymphocyte Rel % 27.6  19.4  32.9    Monocyte Rel % 6.9  7.4  7.8     Eosinophil Rel % 2.2  2.2  2.1    Basophil Rel % 0.7  0.9  1.0        Anemia labs:     Latest Reference Range & Units 04/27/22 09:40 12/14/22 10:12 01/16/23 10:36 08/30/23 12:16   Iron 37 - 145 mcg/dL 42 (E) 29 (L) 24 (L) 42   Ferritin 13.00 - 150.00 ng/mL 14 (L) (E)   12.10 (L)   Iron Saturation 20 - 55 % 12 (L) (E)      Iron Saturation (TSAT) 20 - 50 %  6 (L) 5 (L) 9 (L)   Transferrin 200 - 360 mg/dL  342 315 318   TIBC 298 - 536 mcg/dL 349 (E) 510 469 474   Vitamin B-12 211 - 946 pg/mL 519 (E) 606 618    Folate 4.3 - 25.8 ng/mL 31.00 (H) (E)      (L): Data is abnormally low  (H): Data is abnormally high  (E): External lab result      aMrcia Bradley reports a pain score of 0.  Given her pain assessment as noted, treatment options were discussed and the following options were decided upon as a follow-up plan to address the patient's pain: continuation of current treatment plan for pain.    Patient screened negative for depression based on a PHQ-9 score of 3 on 9/18/2023.     Assessment and Plan   Diagnoses and all orders for this visit:    1. Other iron deficiency anemia (Primary)  -     CBC & Differential; Future  -     Ferritin; Future  -     Iron Profile; Future    New diagnosis for me.  Patient with iron deficiency anemia likely from menstrual blood loss.  She is scheduled for colonoscopy as mentioned below.    In the past she developed severe constipation secondary to oral iron.  However she did not try it any stool softener.  I discussed with her about option of taking oral iron every other day given she has only mild iron deficiency anemia with stool softener such as MiraLAX and milk of magnesia.  Alternatively I also discussed with her about possibility of IV iron.  Side effects associated with IV iron also discussed.  After lengthy discussion she wanted to try oral iron with stool softeners.  If she develops intolerable GI side effect including severe constipation will consider IV iron.  I will recheck  her CBC, ferritin, iron profile in 2 months.    2. Irritable bowel syndrome with both constipation and diarrhea    New diagnosis for me.  Follows with GI.  Scheduled for colonoscopy.    3. Easy bruising    New diagnosis.  Reports small bruises in upper and lower extremity related to trauma.  Discussed that this is physiological bruising.  She does take NSAIDs for her migraine and neck pain which could also explain easy bruising.  No evidence of pathological bruising.  Reassurance given.    Other orders  -     polyethylene glycol (MIRALAX) 17 g packet; Take 17 g by mouth Daily As Needed (constipation) for up to 90 days.  Dispense: 20 each; Refill: 3  -     magnesium hydroxide (Milk of Magnesia) 400 MG/5ML suspension; Take 15 mL by mouth Daily As Needed for Constipation.  Dispense: 473 mL; Refill: 3             Follow Up   No follow-ups on file.  Patient was given instructions and counseling regarding her condition or for health maintenance advice. Please see specific information pulled into the AVS if appropriate.

## 2024-03-18 ENCOUNTER — TELEPHONE (OUTPATIENT)
Dept: PSYCHIATRY | Facility: CLINIC | Age: 34
End: 2024-03-18
Payer: COMMERCIAL

## 2024-03-18 NOTE — TELEPHONE ENCOUNTER
Received referral from [facility]. Called pt to schedule, unable to reach. Left VM regarding referral. Sent pt MyChart message regarding referral/scheduling.
